# Patient Record
Sex: MALE | Race: WHITE | Employment: FULL TIME | URBAN - METROPOLITAN AREA
[De-identification: names, ages, dates, MRNs, and addresses within clinical notes are randomized per-mention and may not be internally consistent; named-entity substitution may affect disease eponyms.]

---

## 2022-04-19 PROBLEM — I44.7 LBBB (LEFT BUNDLE BRANCH BLOCK): Status: ACTIVE | Noted: 2022-04-19

## 2022-04-19 PROBLEM — E78.00 PURE HYPERCHOLESTEROLEMIA: Status: ACTIVE | Noted: 2022-04-19

## 2022-04-19 PROBLEM — R01.1 MURMUR: Status: ACTIVE | Noted: 2022-04-19

## 2022-04-19 PROBLEM — R07.89 CHEST DISCOMFORT: Status: ACTIVE | Noted: 2022-04-19

## 2022-04-19 PROBLEM — Z82.49 FAMILY HISTORY OF CARDIOVASCULAR DISEASE: Status: ACTIVE | Noted: 2022-04-19

## 2022-04-19 PROBLEM — H91.93 BILATERAL DEAFNESS: Status: ACTIVE | Noted: 2022-04-19

## 2022-04-19 PROBLEM — I10 PRIMARY HYPERTENSION: Status: ACTIVE | Noted: 2022-04-19

## 2022-04-27 NOTE — PROGRESS NOTES
Pre-procedure call completed. Spoke with wife. Instructed to take ASA 81 mg x 4 before arrival along with all other am prescribed medications while HOLDING all vitamins, supplements and HCTZ. Instructed to remain NPO after MN.

## 2022-04-28 ENCOUNTER — HOSPITAL ENCOUNTER (OUTPATIENT)
Age: 77
Setting detail: OUTPATIENT SURGERY
Discharge: HOME | End: 2022-04-28
Attending: INTERNAL MEDICINE | Admitting: INTERNAL MEDICINE
Payer: MEDICARE

## 2022-04-28 VITALS
HEIGHT: 67 IN | HEART RATE: 80 BPM | BODY MASS INDEX: 32.8 KG/M2 | RESPIRATION RATE: 18 BRPM | OXYGEN SATURATION: 98 % | WEIGHT: 209 LBS | TEMPERATURE: 98.2 F | DIASTOLIC BLOOD PRESSURE: 66 MMHG | SYSTOLIC BLOOD PRESSURE: 132 MMHG

## 2022-04-28 DIAGNOSIS — R07.89 CHEST DISCOMFORT: ICD-10-CM

## 2022-04-28 DIAGNOSIS — I44.7 LBBB (LEFT BUNDLE BRANCH BLOCK): ICD-10-CM

## 2022-04-28 DIAGNOSIS — I10 PRIMARY HYPERTENSION: ICD-10-CM

## 2022-04-28 LAB
ACT BLD: 267 SECS (ref 70–128)
ACT BLD: 321 SECS (ref 70–128)
ANION GAP SERPL CALC-SCNC: 6 MMOL/L (ref 7–16)
ATRIAL RATE: 52 BPM
ATRIAL RATE: 55 BPM
BUN SERPL-MCNC: 14 MG/DL (ref 8–23)
CALCIUM SERPL-MCNC: 9.2 MG/DL (ref 8.3–10.4)
CALCULATED P AXIS, ECG09: -25 DEGREES
CALCULATED P AXIS, ECG09: 44 DEGREES
CALCULATED R AXIS, ECG10: -57 DEGREES
CALCULATED R AXIS, ECG10: 4 DEGREES
CALCULATED T AXIS, ECG11: -147 DEGREES
CALCULATED T AXIS, ECG11: 138 DEGREES
CHLORIDE SERPL-SCNC: 106 MMOL/L (ref 98–107)
CO2 SERPL-SCNC: 29 MMOL/L (ref 21–32)
CREAT SERPL-MCNC: 0.9 MG/DL (ref 0.8–1.5)
DIAGNOSIS, 93000: NORMAL
DIAGNOSIS, 93000: NORMAL
ERYTHROCYTE [DISTWIDTH] IN BLOOD BY AUTOMATED COUNT: 12.6 % (ref 11.9–14.6)
GLUCOSE SERPL-MCNC: 108 MG/DL (ref 65–100)
HCT VFR BLD AUTO: 37.6 % (ref 41.1–50.3)
HGB BLD-MCNC: 12.7 G/DL (ref 13.6–17.2)
MAGNESIUM SERPL-MCNC: 2.4 MG/DL (ref 1.8–2.4)
MCH RBC QN AUTO: 32.6 PG (ref 26.1–32.9)
MCHC RBC AUTO-ENTMCNC: 33.8 G/DL (ref 31.4–35)
MCV RBC AUTO: 96.7 FL (ref 79.6–97.8)
NRBC # BLD: 0 K/UL (ref 0–0.2)
P-R INTERVAL, ECG05: 202 MS
P-R INTERVAL, ECG05: 214 MS
PLATELET # BLD AUTO: 308 K/UL (ref 150–450)
PMV BLD AUTO: 9 FL (ref 9.4–12.3)
POTASSIUM SERPL-SCNC: 3.9 MMOL/L (ref 3.5–5.1)
Q-T INTERVAL, ECG07: 464 MS
Q-T INTERVAL, ECG07: 464 MS
QRS DURATION, ECG06: 156 MS
QRS DURATION, ECG06: 168 MS
QTC CALCULATION (BEZET), ECG08: 431 MS
QTC CALCULATION (BEZET), ECG08: 443 MS
RBC # BLD AUTO: 3.89 M/UL (ref 4.23–5.6)
SODIUM SERPL-SCNC: 141 MMOL/L (ref 138–145)
VENTRICULAR RATE, ECG03: 52 BPM
VENTRICULAR RATE, ECG03: 55 BPM
WBC # BLD AUTO: 5.5 K/UL (ref 4.3–11.1)

## 2022-04-28 PROCEDURE — 85027 COMPLETE CBC AUTOMATED: CPT

## 2022-04-28 PROCEDURE — 77030013529 HC DEV PLLBK SLED BSC -B: Performed by: INTERNAL MEDICINE

## 2022-04-28 PROCEDURE — 74011250637 HC RX REV CODE- 250/637: Performed by: INTERNAL MEDICINE

## 2022-04-28 PROCEDURE — 77030012468 HC VLV BLEEDBK CNTRL ABBT -B: Performed by: INTERNAL MEDICINE

## 2022-04-28 PROCEDURE — 80048 BASIC METABOLIC PNL TOTAL CA: CPT

## 2022-04-28 PROCEDURE — C1769 GUIDE WIRE: HCPCS | Performed by: INTERNAL MEDICINE

## 2022-04-28 PROCEDURE — 92928 PRQ TCAT PLMT NTRAC ST 1 LES: CPT | Performed by: INTERNAL MEDICINE

## 2022-04-28 PROCEDURE — 99152 MOD SED SAME PHYS/QHP 5/>YRS: CPT | Performed by: INTERNAL MEDICINE

## 2022-04-28 PROCEDURE — C1887 CATHETER, GUIDING: HCPCS | Performed by: INTERNAL MEDICINE

## 2022-04-28 PROCEDURE — 93005 ELECTROCARDIOGRAM TRACING: CPT | Performed by: INTERNAL MEDICINE

## 2022-04-28 PROCEDURE — C1874 STENT, COATED/COV W/DEL SYS: HCPCS | Performed by: INTERNAL MEDICINE

## 2022-04-28 PROCEDURE — 74011000250 HC RX REV CODE- 250: Performed by: INTERNAL MEDICINE

## 2022-04-28 PROCEDURE — 93460 R&L HRT ART/VENTRICLE ANGIO: CPT | Performed by: INTERNAL MEDICINE

## 2022-04-28 PROCEDURE — 85347 COAGULATION TIME ACTIVATED: CPT

## 2022-04-28 PROCEDURE — 92978 ENDOLUMINL IVUS OCT C 1ST: CPT | Performed by: INTERNAL MEDICINE

## 2022-04-28 PROCEDURE — 92979 ENDOLUMINL IVUS OCT C EA: CPT | Performed by: INTERNAL MEDICINE

## 2022-04-28 PROCEDURE — C1894 INTRO/SHEATH, NON-LASER: HCPCS | Performed by: INTERNAL MEDICINE

## 2022-04-28 PROCEDURE — C1725 CATH, TRANSLUMIN NON-LASER: HCPCS | Performed by: INTERNAL MEDICINE

## 2022-04-28 PROCEDURE — C1751 CATH, INF, PER/CENT/MIDLINE: HCPCS | Performed by: INTERNAL MEDICINE

## 2022-04-28 PROCEDURE — 99205 OFFICE O/P NEW HI 60 MIN: CPT | Performed by: THORACIC SURGERY (CARDIOTHORACIC VASCULAR SURGERY)

## 2022-04-28 PROCEDURE — 74011000636 HC RX REV CODE- 636: Performed by: INTERNAL MEDICINE

## 2022-04-28 PROCEDURE — 77030004558 HC CATH ANGI DX SUPR TORQ CARD -A: Performed by: INTERNAL MEDICINE

## 2022-04-28 PROCEDURE — 99153 MOD SED SAME PHYS/QHP EA: CPT | Performed by: INTERNAL MEDICINE

## 2022-04-28 PROCEDURE — 74011000636 HC RX REV CODE- 636

## 2022-04-28 PROCEDURE — 77030029997 HC DEV COM RDL R BND TELE -B: Performed by: INTERNAL MEDICINE

## 2022-04-28 PROCEDURE — 83735 ASSAY OF MAGNESIUM: CPT

## 2022-04-28 PROCEDURE — 74011250636 HC RX REV CODE- 250/636: Performed by: INTERNAL MEDICINE

## 2022-04-28 PROCEDURE — C1753 CATH, INTRAVAS ULTRASOUND: HCPCS | Performed by: INTERNAL MEDICINE

## 2022-04-28 PROCEDURE — C1761 HC CATH LITHO SHOCKWAVE SWMD -J: HCPCS | Performed by: INTERNAL MEDICINE

## 2022-04-28 DEVICE — STENT RONYX35018UX RESOLUTE ONYX 3.50X18
Type: IMPLANTABLE DEVICE | Site: HEART | Status: FUNCTIONAL
Brand: RESOLUTE ONYX™

## 2022-04-28 DEVICE — STENT RONYX35038UX RESOLUTE ONYX 3.50X38
Type: IMPLANTABLE DEVICE | Status: FUNCTIONAL
Brand: RESOLUTE ONYX™

## 2022-04-28 RX ORDER — MIDAZOLAM HYDROCHLORIDE 1 MG/ML
INJECTION, SOLUTION INTRAMUSCULAR; INTRAVENOUS AS NEEDED
Status: DISCONTINUED | OUTPATIENT
Start: 2022-04-28 | End: 2022-04-28 | Stop reason: HOSPADM

## 2022-04-28 RX ORDER — GUAIFENESIN 100 MG/5ML
81 LIQUID (ML) ORAL DAILY
Qty: 1 TABLET | Refills: 0 | Status: SHIPPED
Start: 2022-04-28

## 2022-04-28 RX ORDER — LIDOCAINE HYDROCHLORIDE 10 MG/ML
INJECTION INFILTRATION; PERINEURAL AS NEEDED
Status: DISCONTINUED | OUTPATIENT
Start: 2022-04-28 | End: 2022-04-28 | Stop reason: HOSPADM

## 2022-04-28 RX ORDER — HEPARIN SODIUM 200 [USP'U]/100ML
INJECTION, SOLUTION INTRAVENOUS
Status: COMPLETED | OUTPATIENT
Start: 2022-04-28 | End: 2022-04-28

## 2022-04-28 RX ORDER — HYDROMORPHONE HYDROCHLORIDE 2 MG/ML
INJECTION, SOLUTION INTRAMUSCULAR; INTRAVENOUS; SUBCUTANEOUS AS NEEDED
Status: DISCONTINUED | OUTPATIENT
Start: 2022-04-28 | End: 2022-04-28 | Stop reason: HOSPADM

## 2022-04-28 RX ORDER — GUAIFENESIN 100 MG/5ML
324 LIQUID (ML) ORAL DAILY
Status: DISCONTINUED | OUTPATIENT
Start: 2022-04-28 | End: 2022-04-28 | Stop reason: HOSPADM

## 2022-04-28 RX ORDER — HEPARIN SODIUM 10000 [USP'U]/ML
INJECTION, SOLUTION INTRAVENOUS; SUBCUTANEOUS AS NEEDED
Status: DISCONTINUED | OUTPATIENT
Start: 2022-04-28 | End: 2022-04-28 | Stop reason: HOSPADM

## 2022-04-28 RX ORDER — SODIUM CHLORIDE 9 MG/ML
75 INJECTION, SOLUTION INTRAVENOUS CONTINUOUS
Status: DISCONTINUED | OUTPATIENT
Start: 2022-04-28 | End: 2022-04-28 | Stop reason: HOSPADM

## 2022-04-28 RX ADMIN — SODIUM CHLORIDE 75 ML/HR: 900 INJECTION, SOLUTION INTRAVENOUS at 06:58

## 2022-04-28 NOTE — PROGRESS NOTES
Anna Quinteros. MD Daryn Aguiar. Ayaz Muñoz MD           Consult Note    Roxana Swain [facility]         4/19/2022 1945    REFERRING PHYSICIAN:  Dr. Kyung Islas:  The patient is a 78 y.o. male who is seen for evaluation of aortic stenosis. He was seen in the office in consultation by Dr Marty Garcia for recurrent chest pain and TOBAR. He was noted to have a significant murmur on exam. Echocardiogram showed a mildly reduced LV EF and severe AS. LHC was planned as well. He underwent cardiac catheterization today that showed severe stenosis of the RCA and LAD. Past Medical History:   Diagnosis Date    Hypertension        History reviewed. No pertinent surgical history. History reviewed. No pertinent family history. Social History     Socioeconomic History    Marital status:      Spouse name: Not on file    Number of children: Not on file    Years of education: Not on file    Highest education level: Not on file   Occupational History    Not on file   Tobacco Use    Smoking status: Never Smoker    Smokeless tobacco: Never Used   Substance and Sexual Activity    Alcohol use: Not on file    Drug use: Not on file    Sexual activity: Not on file   Other Topics Concern    Not on file   Social History Narrative    Not on file     Social Determinants of Health     Financial Resource Strain:     Difficulty of Paying Living Expenses: Not on file   Food Insecurity:     Worried About Running Out of Food in the Last Year: Not on file    Ky of Food in the Last Year: Not on file   Transportation Needs:     Lack of Transportation (Medical): Not on file    Lack of Transportation (Non-Medical):  Not on file   Physical Activity:     Days of Exercise per Week: Not on file    Minutes of Exercise per Session: Not on file   Stress:     Feeling of Stress : Not on file   Social Connections:     Frequency of Communication with Friends and Family: Not on file    Frequency of Social Gatherings with Friends and Family: Not on file    Attends Shinto Services: Not on file    Active Member of Clubs or Organizations: Not on file    Attends Club or Organization Meetings: Not on file    Marital Status: Not on file   Intimate Partner Violence:     Fear of Current or Ex-Partner: Not on file    Emotionally Abused: Not on file    Physically Abused: Not on file    Sexually Abused: Not on file   Housing Stability:     Unable to Pay for Housing in the Last Year: Not on file    Number of [place] in the Last Year: Not on file    Unstable Housing in the Last Year: Not on file       Not on File    No current facility-administered medications on file prior to encounter. Current Outpatient Medications on File Prior to Encounter   Medication Sig Dispense Refill    lisinopril-hydroCHLOROthiazide (PRINZIDE, ZESTORETIC) 20-25 mg per tablet Take  by mouth daily.  OTHER Allergy relief      multivitamin (ONE A DAY) tablet Take 1 Tablet by mouth daily.  vitamin E acetate (VITAMIN E PO) Take  by mouth.  cyanocobalamin, vitamin B-12, (VITAMIN B-12 PO) Take  by mouth.  docosahexaenoic acid/epa (FISH OIL PO) Take  by mouth.  pitavastatin calcium (Livalo) 4 mg tab tablet Take 1 Tablet by mouth daily. 30 Tablet 5    isosorbide mononitrate ER (IMDUR) 30 mg tablet Take 1 Tablet by mouth every morning. 30 Tablet 5       REVIEW OF SYSTEMS:  Review of Systems   Constitutional: Negative for chills, fever, malaise/fatigue, weight gain and weight loss. HENT: Negative for ear pain, hearing loss, nosebleeds, sore throat and tinnitus. Eyes: Negative for blurred vision, vision loss in left eye and vision loss in right eye. Cardiovascular: Positive for chest pain and dyspnea on exertion. Negative for leg swelling, near-syncope, orthopnea, palpitations, paroxysmal nocturnal dyspnea and syncope. Respiratory: Positive for shortness of breath.  Negative for cough, hemoptysis, sputum production and wheezing. Endocrine: Negative for cold intolerance, heat intolerance and polydipsia. Hematologic/Lymphatic: Does not bruise/bleed easily. Skin: Negative for color change and rash. Musculoskeletal: Negative for back pain, joint pain, joint swelling and myalgias. Gastrointestinal: Negative for abdominal pain, constipation, diarrhea, dysphagia, heartburn, hematemesis, melena, nausea and vomiting. Genitourinary: Negative for dysuria, frequency, hematuria and urgency. Neurological: Negative for difficulty with concentration, dizziness, headaches, light-headedness, numbness, paresthesias, seizures, vertigo and weakness. Psychiatric/Behavioral: Negative for altered mental status and depression.        Physical Exam  Vitals:    04/28/22 1145 04/28/22 1200 04/28/22 1215 04/28/22 1230   BP: (!) 142/83 (!) 153/89 (!) 149/79    Pulse: 71 (!) 54 67 69   Resp:       Temp:       SpO2: 96% 97% 96% 97%   Weight:       Height:           Physical Exam:  General: Well Developed, Well Nourished, No Acute Distress  HEENT: Normocephalic, pupils equal and round, no scleral icterus  Neck: supple, no JVD  Chest wall: No deformity  Heart: S1S2 with RRR with grade III/VI DORIE   Lungs: Clear throughout auscultation bilaterally without adventitious sounds  Abd: soft, nontender, nondistended, with good bowel sounds, no pulsatile masses  Ext: warm, no edema, calves supple/nontender, pulses 2+ bilaterally  Skin: warm and dry, no rashes, cyanosis, jaundice, ecchymoses or evidence of skin breakdown  Psychiatric: Normal mood and affect  Neurologic: Alert and oriented X 3, no focal deficit noted    Labs:   Recent Labs     04/28/22  0633      K 3.9   MG 2.4   BUN 14   CREA 0.90   *   WBC 5.5   HGB 12.7*   HCT 37.6*          Assessment:     Active Problems:    LBBB (left bundle branch block) (4/19/2022)    Primary hypertension (4/19/2022)    Chest discomfort (4/19/2022)    CAD    Aortic Stenosis       Plan:     SAVR vs TAVR discussed with patient and wife. Pt prefers TAVR.        Anastasia Barnes MD

## 2022-04-28 NOTE — PROGRESS NOTES
Patient feeling SOB. Patient has already been drinking some pepsi but now is trying some coffee to see if that will help possible side effects from Brilinta. Dr. Domitila Toscano made aware.

## 2022-04-28 NOTE — PROGRESS NOTES
Gómez Mckeon. MD Oliverio Savage. Delos Goldberg, MD           [address]         4/19/2022 1945    REFERRING PHYSICIAN:  Dr. Masterson:  The patient is a 78 y.o. male who is seen for evaluation of aortic stenosis. He was seen in the office in consultation by Dr Trey Mohs for recurrent chest pain and TOBAR. He was noted to have a significant murmur on exam. Echocardiogram showed a mildly reduced LV EF and severe AS. LHC was planned as well. He underwent cardiac catheterization today that showed severe stenosis of the RCA and LAD. Past Medical History:   Diagnosis Date    Hypertension        History reviewed. No pertinent surgical history. History reviewed. No pertinent family history. Social History     Socioeconomic History    Marital status:      Spouse name: Not on file    Number of children: Not on file    Years of education: Not on file    Highest education level: Not on file   Occupational History    Not on file   Tobacco Use    Smoking status: Never Smoker    Smokeless tobacco: Never Used   Substance and Sexual Activity    Alcohol use: Not on file    Drug use: Not on file    Sexual activity: Not on file   Other Topics Concern    Not on file   Social History Narrative    Not on file     Social Determinants of Health     Financial Resource Strain:     Difficulty of Paying Living Expenses: Not on file   Food Insecurity:     Worried About Running Out of Food in the Last Year: Not on file    [place] of Food in the Last Year: Not on file   Transportation Needs:     Lack of Transportation (Medical): Not on file    Lack of Transportation (Non-Medical):  Not on file   Physical Activity:     Days of Exercise per Week: Not on file    Minutes of Exercise per Session: Not on file   Stress:     Feeling of Stress : Not on file   Social Connections:     Frequency of Communication with Friends and Family: Not on file    Frequency of Social Gatherings with Friends and Family: Not on file    Attends Hoahaoism Services: Not on file    Active Member of Clubs or Organizations: Not on file    Attends Club or Organization Meetings: Not on file    Marital Status: Not on file   Intimate Partner Violence:     Fear of Current or Ex-Partner: Not on file    Emotionally Abused: Not on file    Physically Abused: Not on file    Sexually Abused: Not on file   Housing Stability:     Unable to Pay for Housing in the Last Year: Not on file    Number of [place] in the Last Year: Not on file    Unstable Housing in the Last Year: Not on file       Not on File    No current facility-administered medications on file prior to encounter. Current Outpatient Medications on File Prior to Encounter   Medication Sig Dispense Refill    lisinopril-hydroCHLOROthiazide (PRINZIDE, ZESTORETIC) 20-25 mg per tablet Take  by mouth daily.  OTHER Allergy relief      multivitamin (ONE A DAY) tablet Take 1 Tablet by mouth daily.  vitamin E acetate (VITAMIN E PO) Take  by mouth.  cyanocobalamin, vitamin B-12, (VITAMIN B-12 PO) Take  by mouth.  docosahexaenoic acid/epa (FISH OIL PO) Take  by mouth.  pitavastatin calcium (Livalo) 4 mg tab tablet Take 1 Tablet by mouth daily. 30 Tablet 5    isosorbide mononitrate ER (IMDUR) 30 mg tablet Take 1 Tablet by mouth every morning. 30 Tablet 5       REVIEW OF SYSTEMS:  Review of Systems   Constitutional: Negative for chills, fever, malaise/fatigue, weight gain and weight loss. HENT: Negative for ear pain, hearing loss, nosebleeds, sore throat and tinnitus. Eyes: Negative for blurred vision, vision loss in left eye and vision loss in right eye. Cardiovascular: Positive for chest pain and dyspnea on exertion. Negative for leg swelling, near-syncope, orthopnea, palpitations, paroxysmal nocturnal dyspnea and syncope. Respiratory: Positive for shortness of breath.  Negative for cough, hemoptysis, sputum production and wheezing. Endocrine: Negative for cold intolerance, heat intolerance and polydipsia. Hematologic/Lymphatic: Does not bruise/bleed easily. Skin: Negative for color change and rash. Musculoskeletal: Negative for back pain, joint pain, joint swelling and myalgias. Gastrointestinal: Negative for abdominal pain, constipation, diarrhea, dysphagia, heartburn, hematemesis, melena, nausea and vomiting. Genitourinary: Negative for dysuria, frequency, hematuria and urgency. Neurological: Negative for difficulty with concentration, dizziness, headaches, light-headedness, numbness, paresthesias, seizures, vertigo and weakness. Psychiatric/Behavioral: Negative for altered mental status and depression.        Physical Exam  Vitals:    04/28/22 0652   BP: (!) 147/90   Pulse: (!) 59   Resp: 18   Temp: 98.2 °F (36.8 °C)   SpO2: 97%   Weight: 209 lb (94.8 kg)   Height: 5' 7\" (1.702 m)       Physical Exam:  General: Well Developed, Well Nourished, No Acute Distress  HEENT: Normocephalic, pupils equal and round, no scleral icterus  Neck: supple, no JVD  Chest wall: No deformity  Heart: S1S2 with RRR with grade III/VI DORIE   Lungs: Clear throughout auscultation bilaterally without adventitious sounds  Abd: soft, nontender, nondistended, with good bowel sounds, no pulsatile masses  Ext: warm, no edema, calves supple/nontender, pulses 2+ bilaterally  Skin: warm and dry, no rashes, cyanosis, jaundice, ecchymoses or evidence of skin breakdown  Psychiatric: Normal mood and affect  Neurologic: Alert and oriented X 3, no focal deficit noted    Labs:   Recent Labs     04/28/22  0633      K 3.9   MG 2.4   BUN 14   CREA 0.90   *   WBC 5.5   HGB 12.7*   HCT 37.6*          Assessment:     Active Problems:    LBBB (left bundle branch block) (4/19/2022)    Primary hypertension (4/19/2022)    Chest discomfort (4/19/2022)    CAD    Aortic Stenosis       Plan:     SAVR vs TAVR discussed with patient and wife. Pt prefers TAVR.        Lashay Dejesus PA-C

## 2022-04-28 NOTE — PROGRESS NOTES
Discharge Same Day as PCI Teaching    Discharge teaching completed. Patient instructed on right radial site care, including symptoms to report to MD, medication changes & Follow up Care to include:    1- Patient is being treated with 2 separate anti-platelet medications including aspirin 81 mg  & Brilinta 90 mg every 12 hours. It is very important that these medications are filled today started tonight. Patient instructed on the importance of these medications & that these medications must not be stopped for any reason or without talking to the doctor first.  2-  Patient is also being discharged on a medication for cholesterol management (ie-statin)  and instructed on the importance of continuing this medication as well. 3- Patient received a referral for [facility], contact information was faxed to Cardiac rehab & patient given telephone number to contact [phone]) Cardiac Rehab if they have not heard from them within 10 days.

## 2022-04-28 NOTE — ROUTINE PROCESS
Patient received to [address] [place] [address] room # 10  Ambulatory from [facility]. Patient scheduled for [facility] today with Dr Cyndi Ware. Procedure reviewed & questions answered, voiced good understanding consent obtained & placed on chart. All medications and medical history reviewed. Will prep patient per orders. Patient & family updated on plan of care. The patient has a fraility score of 3-MANAGING WELL, based on reports no recent falls.     ASA 81 mg x 4 @ 0500

## 2022-04-28 NOTE — PROGRESS NOTES
Report received from [facility] Lab RN. Procedural findings communicated. Intra procedural  medication administration reviewed. Progression of care discussed.      Patient received into [address] [facility] 5 post sheath removal.     Access site without bleeding or swelling yes    Dressing dry and intact yes    Patient instructed to limit movement to right upper extremity    Routine post procedural vital signs and site assessment initiated yes

## 2022-04-28 NOTE — DISCHARGE INSTRUCTIONS
POST PCI/STENT DISCHARGE INSTRUCTIONS    1. Check puncture site frequently for swelling or bleeding. If there is any bleeding, lie down and apply pressure over the area with a clean towel or washcloth and call 911. Notify your doctor for any redness, swelling, drainage, or oozing from the puncture site. Notify your doctor for any fever or chills. 2. If the extremity becomes cold, numb, or painful call [facility] at [phone].    3. Activity should be limited for the next 48-72 hours. No heavy lifting (anything over 10 pounds) for 3 days. No pushing or pulling with right arm for the next three days. 4.  You may resume your usual diet. Drink more fluids than usual.    5.  Have a responsible person drive you home and stay with you for at least 24 hours after your heart catheterization/angiography. No driving for 24 hours. 6. You may remove bandage from your right arm in 24 hours. You may shower in 24 hours. No tub baths, hot tubs, or swimming for 1 week. Do not place any lotions, creams, powders, or ointments over puncture site for 1 week. You may place a clean band-aid over the puncture site each day for 5 days. Change daily. YOU ARE BEING DISCHARGED ON TWO ANTI-PLATELET MEDICATIONS    1- Brilinta 90 mg every 12 hours    2- Aspirin 81 mg daily    THESE MEDICATIONS  ARE VERY IMPORTANT TO YOUR RECOVERY AND  MUST BE TAKEN EXACTLY AS PRESCRIBED & NOT STOPPED FOR ANY REASON. THESE MAY ONLY BE STOPPED WITH AN ORDER FROM YOUR CARDIOLOGIST. PLEASE HAVE THESE FILLED IMMEDIATELY AND START TAKING Brilinta tonight     ***IF YOUR PHARMACY IS UNABLE TO FILL YOUR ANTIPLATELET MEDICATION *** IMMEDIATELY,  PLEASE CALL [facility] CARDIOLOGY -[phone], EVEN IF IT'S AFTER OFFICE HOURS. AGAIN, IT'S VERY IMPORTANT THAT NO DOSES ARE MISSED    YOU ARE ALSO BEING DISCHARGED ON A MEDICATION FOR CHOLESTEROL MANAGEMENT. 1- Livalo 4 mg daily      You have also been referred to [facility].  Someone from Cardiac Rehab will be calling you to set up a follow up appointment. If they have not called you within a week,  please call [phone]. Sedation for a Medical Procedure: Care Instructions     You were given a sedative medication during your visit. While many of the effects will have worn   off before you leave; you may continue to feel some effects for several hours. Common side effects from sedation include:  · Feeling sleepy. (Your doctors and nurses will make sure you are not too sleepy to go home.)  · Nausea and vomiting. This usually does not last long. · Feeling tired. How can you care for yourself at home? Activity    · Don't do anything for 24 hours that requires attention to detail. It takes time for the medicine effects to completely wear off. · Do not make important legal decisions for 24 hours. · Do not sign any legal documents for 24 hours. · Do not drink alcohol today     · For your safety, you should not drive or operate heavy machinery for the remainder of the day     · Rest when you feel tired. Getting enough sleep will help you recover. Diet    · You can eat your normal diet, unless your doctor gives you other instructions. If your stomach is upset, try clear liquids and bland, low-fat foods like plain toast or rice. · Drink plenty of fluids (unless your doctor tells you not to). · Don't drink alcohol for 24 hours. Medicines    · Be safe with medicines. Read and follow all instructions on the label. · If the doctor gave you a prescription medicine for pain, take it as prescribed. · If you are not taking a prescription pain medicine, ask your doctor if you can take an over-the-counter medicine. · If you think your pain medicine is making you sick to your stomach:  · Take your medicine after meals (unless your doctor has told you not to). · Ask your doctor for a different pain medicine.              Percutaneous Coronary Intervention: What to Expect at [facility]     Percutaneous coronary intervention (PCI) is the name for procedures that are used to open a narrowed or blocked coronary artery. The two most common PCI procedures are coronary angioplasty and coronary stent placement. Your groin or arm may have a bruise and feel sore for a day or two after a percutaneous coronary intervention (PCI). You can do light activities around the house, but nothing strenuous for several days. This care sheet gives you a general idea about how long it will take for you to recover. But each person recovers at a different pace. Follow the steps below to get better as quickly as possible. How can you care for yourself at home? Activity  · Do not do strenuous exercise and do not lift, pull, or push anything heavy until your doctor says it is okay. This may be for a day or two. You can walk around the house and do light activity, such as cooking. · You may shower 24 to 48 hours after the procedure, if your doctor okays it. Pat the incision dry. Do not take a bath for 1 week, or until your doctor tells you it is okay. · If the catheter was placed in your groin, try not to walk up stairs for the first couple of days. · If the catheter was placed in your arm near your wrist, do not bend your wrist deeply for the first couple of days. Be careful using your hand to get into and out of a chair or bed. · If your doctor recommends it, get more exercise. Walking is a good choice. Bit by bit, increase the amount you walk every day. Try for at least 30 minutes on most days of the week. Diet  · Drink plenty of fluids to help your body flush out the dye. If you have kidney, heart, or liver disease and have to limit fluids, talk with your doctor before you increase the amount of fluids you drink. · Keep eating a heart-healthy diet that has lots of fruits, vegetables, and whole grains. If you have not been eating this way, talk to your doctor.  You also may want to talk to a dietitian. This expert can help you to learn about healthy foods and plan meals. Medicines  · Your doctor will tell you if and when you can restart your medicines. He or she will also give you instructions about taking any new medicines. · If you take blood thinners, such as warfarin (Coumadin), clopidogrel (Plavix), or aspirin, be sure to talk to your doctor. He or she will tell you if and when to start taking those medicines again. Make sure that you understand exactly what your doctor wants you to do. · Your doctor will prescribe blood-thinning medicines. You will likely take aspirin plus another antiplatelet, such as clopidogrel (Plavix). It is very important that you take these medicines exactly as directed. These medicines help keep the coronary artery open and reduce your risk of a heart attack. · Call your doctor if you think you are having a problem with your medicine. Care of the catheter site  · For 1 or 2 days, keep a bandage over the spot where the catheter was inserted. The bandage probably will fall off in this time. · Put ice or a cold pack on the area for 10 to 20 minutes at a time to help with soreness or swelling. Put a thin cloth between the ice and your skin. Follow-up care is a key part of your treatment and safety. Be sure to make and go to all appointments, and call your doctor if you are having problems. It's also a good idea to know your test results and keep a list of the medicines you take. When should you call for help? Call 911 anytime you think you may need emergency care. For example, call if:  · You passed out (lost consciousness). · You have severe trouble breathing. · You have sudden chest pain and shortness of breath, or you cough up blood. · You have symptoms of a heart attack, such as:  ¨ Chest pain or pressure. ¨ Sweating. ¨ Shortness of breath. ¨ Nausea or vomiting.   ¨ Pain that spreads from the chest to the neck, jaw, or one or both shoulders or arms. ¨ Dizziness or lightheadedness. ¨ A fast or uneven pulse. After calling 911, chew 1 adult-strength aspirin. Wait for an ambulance. Do not try to drive yourself. · You have been diagnosed with angina, and you have angina symptoms that do not go away with rest or are not getting better within 5 minutes after you take one dose of nitroglycerin. Call your doctor now or seek immediate medical care if:  · You are bleeding from the area where the catheter was put in your artery. · You have a fast-growing, painful lump at the catheter site. · You have signs of infection, such as:  ¨ Increased pain, swelling, warmth, or redness. ¨ Red streaks leading from the catheter site. ¨ Pus draining from the catheter site. ¨ A fever. · Your leg or arm looks blue or feels cold, numb, or tingly. Watch closely for changes in your health, and be sure to contact your doctor if you have any problems. Where can you learn more? Go to [employer].be  Enter M274 in the search box to learn more about \"Percutaneous Coronary Intervention: What to Expect at Home. \"   © 8657-3404 Healthwise, Incorporated. Care instructions adapted under license by [employer] (which disclaims liability or warranty for this information). This care instruction is for use with your licensed healthcare professional. If you have questions about a medical condition or this instruction, always ask your healthcare professional. [place] [address] any warranty or liability for your use of this information. Content Version: 11.6.812880; Current as of: May 22, 2015         Cardiac Rehabilitation: Care Instructions  Your Care Instructions    Cardiac rehabilitation is a program for people who have a heart problem, such as a heart attack, heart failure, or a heart valve disease. The program includes exercise, lifestyle changes, education, and emotional support.  Cardiac rehab can help you improve the quality of your life through better overall health. It can help you lose weight and feel better about yourself. On your cardiac rehab team, you may have your doctor, a nurse specialist, a dietitian, and a physical therapist. They will design your cardiac rehab program specifically for you. You will learn how to reduce your risk for heart problems, how to manage stress, and how to eat a heart-healthy diet. By the end of the program, you will be ready to maintain a healthier lifestyle on your own. Follow-up care is a key part of your treatment and safety. Be sure to make and go to all appointments, and call your doctor if you are having problems. It's also a good idea to know your test results and keep a list of the medicines you take. How can you care for yourself at home? · Take your medicines exactly as prescribed. Call your doctor if you think you are having a problem with your medicine. You will get more details on the specific medicines your doctor prescribes. · Weigh yourself every day if your doctor tells you to. Watch for sudden weight gain. Weigh yourself on the same scale with the same amount of clothing at the same time of day. · Plan your meals so that you are eating heart-healthy foods. ¨ Eat a variety of foods daily. Fresh fruits and vegetables and whole-grains are good choices. ¨ Limit your fat intake, especially saturated and trans fat. ¨ Limit salt (sodium). ¨ Increase fiber in your diet. ¨ Limit alcohol. · Learn how to take your pulse so that you can track your heart rate during exercise. · Always check with your doctor before you begin a new exercise program.  · Warm up before you exercise and cool down afterward for at least 15 minutes each. This will help your heart gradually prepare for and recover from exercise and avoid pushing your heart too hard. · Stop exercising if you have any unusual discomfort, such as chest pain. · Do not smoke. Smoking can make heart problems worse.  If you need help quitting, talk to your doctor about stop-smoking programs and medicines. These can increase your chances of quitting for good. When should you call for help? Call 911 anytime you think you may need emergency care. For example, call if:  · You have severe trouble breathing. · You cough up pink, foamy mucus and you have trouble breathing. · You have symptoms of a heart attack. These may include:  ¨ Chest pain or pressure, or a strange feeling in the chest.  ¨ Sweating. ¨ Shortness of breath. ¨ Nausea or vomiting. ¨ Pain, pressure, or a strange feeling in the back, neck, jaw, or upper belly or in one or both shoulders or arms. ¨ Lightheadedness or sudden weakness. ¨ A fast or irregular heartbeat. After you call 911, the  may tell you to chew 1 adult-strength or 2 to 4 low-dose aspirin. Wait for an ambulance. Do not try to drive yourself. · You have angina symptoms (such as chest pain or pressure) that do not go away with rest or are not getting better within 5 minutes after you take a dose of nitroglycerin. · You have symptoms of a stroke. These may include:  ¨ Sudden numbness, tingling, weakness, or loss of movement in your face, arm, or leg, especially on only one side of your body. ¨ Sudden vision changes. ¨ Sudden trouble speaking. ¨ Sudden confusion or trouble understanding simple statements. ¨ Sudden problems with walking or balance. ¨ A sudden, severe headache that is different from past headaches. · You passed out (lost consciousness). Call your doctor now or seek immediate medical care if:  · You have new or increased shortness of breath. · You are dizzy or lightheaded, or you feel like you may faint. · You gain weight suddenly, such as 3 pounds or more in 2 to 3 days. (Your doctor may suggest a different range of weight gain.)  · You have increased swelling in your legs, ankles, or feet.   Watch closely for changes in your health, and be sure to contact your doctor if you have any problems. Where can you learn more? Go to http://www.gray.com/. Enter R750 in the search box to learn more about \"Cardiac Rehabilitation: Care Instructions. \"  Current as of: May 5, 2016  Content Version: 11.1  © 7765-0923 [employer]. Care instructions adapted under license by [employer] (which disclaims liability or warranty for this information). If you have questions about a medical condition or this instruction, always ask your healthcare professional. [facility] any warranty or liability for your use of this information. [email or web address] Activation    Thank you for requesting access to HealthTell. Please follow the instructions below to securely access and download your online medical record. HealthTell allows you to send messages to your doctor, view your test results, renew your prescriptions, schedule appointments, and more. How Do I Sign Up? 1. In your internet browser, go to [email or web address]. 2. Click on the First Time User? Click Here link in the Sign In box. You will see the New Member Sign Up page. 3. Enter your HealthTell Access Code exactly as it appears below. You will not need to use this code after youve completed the sign-up process. If you do not sign up before the expiration date, you must request a new code. HealthTell Access Code: Activation code not generated  Current HealthTell Status: Active (This is the date your HealthTell access code will )    4. Enter the last four digits of your Social Security Number (xxxx) and Date of Birth (mm/dd/yyyy) as indicated and click Submit. You will be taken to the next sign-up page. 5. Create a [email or web address] ID. This will be your HealthTell login ID and cannot be changed, so think of one that is secure and easy to remember. 6. Create a HealthTell password. You can change your password at any time. 7. Enter your Password Reset Question and Answer.  This can be used at a later time if you forget your password. 8. Enter your e-mail address. You will receive e-mail notification when new information is available in [address] 9. Click Sign Up. You can now view and download portions of your medical record. 10. Click the Download Summary menu link to download a portable copy of your medical information. Additional Information    If you have questions, please visit the Frequently Asked Questions section of the ReadyCart website at [email or web address] Remember, ReadyCart is NOT to be used for urgent needs. For medical emergencies, dial 911.

## 2022-04-28 NOTE — DISCHARGE SUMMARY
[facility] Cardiology Discharge Summary     Patient ID:  Gia Asencio  [id number]  78 y.o.  1945    Admit date: 4/28/2022    Discharge date:  04/28/22    Admitting Physician: Margy Espinoza MD     Discharge Physician: Rona Cardona NP/Dr. LIZANDRO NEUMANN JR[facility]    Admission Diagnoses: Chest discomfort [R07.89]  LBBB (left bundle branch block) [I44.7]  Primary hypertension [I10]    Discharge Diagnoses:    Diagnosis    LBBB (left bundle branch block)    Primary hypertension    Pure hypercholesterolemia    Family history of cardiovascular disease    Bilateral deafness    Murmur    Chest discomfort       Cardiology Procedures this admission:  Left heart catheterization with PCI  Consults: None    Hospital Course: Patient was seen at the office of [facility] Cardiology by Dr. Devon Pool for complaints of chest discomfort concerning for anginal pain and was subsequently scheduled for a LHC. The patient had a LHC by Dr LIZANDRO NEUMANN JR. 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 Patient tolerated the procedure well and was taken to the [address] [place] [address] for recover. The patients recent echo that shows severe AS. During this hospital admission that the patient was evaluated by CV surgery for SAVR vrs TAVR and the patient prefers TAVR at this time. The patient will coordinate with the TAVR originator who was informed about the patient. The following morning patient was up feeling well without any complaints of chest pain or shortness of breath. Patient's right radial cath site was clean, dry and intact without hematoma or bruit. Patient's labs were WNL. Patient was seen and examined by Dr. LIZANDRO NEUMANN JR[facility] and determined stable and ready for discharge. Patient was instructed on the importance of medication compliance. For the OMT of CAD the patient patient will be on the following regiment: The patient will not be on betablocker therapy due to bradycardia. The patient will continue on a ACE.  The patient will continue on statin therapy. The patient will be on DAPT with for one year with: ASA Wayan. The patient will follow up with [facility] and has been referred to cardiac rehab. DISPOSITION: The patient is being discharged home in stable condition on a low saturated fat, low cholesterol and low salt diet. The patient is instructed to advance activities as tolerated to the limit of fatigue or shortness of breath. The patient is instructed to avoid all heavy lifting for 5 days. The patient is instructed to watch the cath site for bleeding/oozing; if seen, the patient is instructed to apply firm pressure with a clean cloth and call [facility] Cardiology at [phone]. The patient is instructed to watch for signs of infection which include: increasing area of redness, fever/hot to touch or purulent drainage at the catheterization site. The patient is instructed not to soak in a bathtub for 7-10 days, but is cleared to shower. The patient is instructed to call the office or return to the ER for immediate evaluation for any shortness of breath or chest pain not relieved by NTG. Discharge Exam:   Visit Vitals  BP (!) 149/79   Pulse 69   Temp 98.2 °F (36.8 °C)   Resp 18   Ht 5' 7\" (1.702 m)   Wt 94.8 kg (209 lb)   SpO2 97%   BMI 32.73 kg/m²     Patient has been seen by Dr. Jacob Rudd: see his progress note for exam details.     Recent Results (from the past 24 hour(s))   CBC W/O DIFF    Collection Time: 04/28/22  6:33 AM   Result Value Ref Range    WBC 5.5 4.3 - 11.1 K/uL    RBC 3.89 (L) 4.23 - 5.6 M/uL    HGB 12.7 (L) 13.6 - 17.2 g/dL    HCT 37.6 (L) 41.1 - 50.3 %    MCV 96.7 79.6 - 97.8 FL    MCH 32.6 26.1 - 32.9 PG    MCHC 33.8 31.4 - 35.0 g/dL    RDW 12.6 11.9 - 14.6 %    PLATELET 120 087 - 083 K/uL    MPV 9.0 (L) 9.4 - 12.3 FL    ABSOLUTE NRBC 0.00 0.0 - 0.2 K/uL   MAGNESIUM    Collection Time: 04/28/22  6:33 AM   Result Value Ref Range    Magnesium 2.4 1.8 - 2.4 mg/dL   METABOLIC PANEL, BASIC Collection Time: 04/28/22  6:33 AM   Result Value Ref Range    Sodium 141 138 - 145 mmol/L    Potassium 3.9 3.5 - 5.1 mmol/L    Chloride 106 98 - 107 mmol/L    CO2 29 21 - 32 mmol/L    Anion gap 6 (L) 7 - 16 mmol/L    Glucose 108 (H) 65 - 100 mg/dL    BUN 14 8 - 23 MG/DL    Creatinine 0.90 0.8 - 1.5 MG/DL    GFR est AA >60 >60 ml/min/1.73m2    GFR est non-AA >60 >60 ml/min/1.73m2    Calcium 9.2 8.3 - 10.4 MG/DL   EKG, 12 LEAD, INITIAL    Collection Time: 04/28/22  7:07 AM   Result Value Ref Range    Ventricular Rate 52 BPM    Atrial Rate 52 BPM    P-R Interval 202 ms    QRS Duration 168 ms    Q-T Interval 464 ms    QTC Calculation (Bezet) 431 ms    Calculated P Axis -25 degrees    Calculated R Axis 4 degrees    Calculated T Axis -147 degrees    Diagnosis       Sinus bradycardia  Left bundle branch block  Abnormal ECG  When compared with ECG of 17-NOV-2014 22:11,  Left bundle branch block is now Present     POC ACTIVATED CLOTTING TIME    Collection Time: 04/28/22  9:08 AM   Result Value Ref Range    Activated Clotting Time (POC) 321 (H) 70 - 128 SECS   POC ACTIVATED CLOTTING TIME    Collection Time: 04/28/22  9:49 AM   Result Value Ref Range    Activated Clotting Time (POC) 267 (H) 70 - 128 SECS         Patient Instructions:     Current Discharge Medication List      START taking these medications    Details   aspirin 81 mg chewable tablet Take 1 Tablet by mouth daily. Qty: 1 Tablet, Refills: 0  Start date: 4/28/2022      ticagrelor (Brilinta) 90 mg tablet Take 1 Tablet by mouth two (2) times a day. Qty: 60 Tablet, Refills: 5  Start date: 4/28/2022         CONTINUE these medications which have NOT CHANGED    Details   lisinopril-hydroCHLOROthiazide (PRINZIDE, ZESTORETIC) 20-25 mg per tablet Take  by mouth daily. OTHER Allergy relief      multivitamin (ONE A DAY) tablet Take 1 Tablet by mouth daily.       vitamin E acetate (VITAMIN E PO) Take  by mouth.      cyanocobalamin, vitamin B-12, (VITAMIN B-12 PO) Take by mouth. docosahexaenoic acid/epa (FISH OIL PO) Take  by mouth.      pitavastatin calcium (Livalo) 4 mg tab tablet Take 1 Tablet by mouth daily.   Qty: 30 Tablet, Refills: 5         STOP taking these medications       isosorbide mononitrate ER (IMDUR) 30 mg tablet Comments:   Reason for Stopping:               Signed:  OSVALDO Waters  4/28/2022  1:14 PM

## 2022-04-28 NOTE — PROGRESS NOTES
TRANSFER - OUT REPORT:    R/LHC Bonds  RRA/RBV 7 fr sheath capped  RCA 1 stent, LAD 1 stent  Versed 1 mg  Dilaudid 1 mg  Heparin 11,000 units  Brilinta 180 mg  Band 14 ml  No bleeding/hematoma    Verbal report given to April(name) on Lydia Cruz  being transferred to cpru(unit) for routine progression of care       Report consisted of patients Situation, Background, Assessment and   Recommendations(SBAR). Information from the following report(s) SBAR and Procedure Summary was reviewed with the receiving nurse. Lines:   Peripheral IV 04/28/22 Left Antecubital (Active)   Site Assessment Clean, dry, & intact 04/28/22 0656   Phlebitis Assessment 0 04/28/22 0656   Dressing Status Clean, dry, & intact 04/28/22 0656   Dressing Type Transparent 04/28/22 0656   Hub Color/Line Status Pink 04/28/22 0656   Alcohol Cap Used No 04/28/22 0656       Peripheral IV 04/28/22 Right Antecubital (Active)   Site Assessment Clean, dry, & intact 04/28/22 0656   Phlebitis Assessment 0 04/28/22 0656   Dressing Status Clean, dry, & intact 04/28/22 0656   Dressing Type Transparent 04/28/22 0656   Hub Color/Line Status Green 04/28/22 0656   Alcohol Cap Used No 04/28/22 0656        Opportunity for questions and clarification was provided.

## 2022-04-29 ENCOUNTER — TELEPHONE (OUTPATIENT)
Dept: CASE MANAGEMENT | Age: 77
End: 2022-04-29

## 2022-04-29 DIAGNOSIS — I35.0 AORTIC VALVE STENOSIS, ETIOLOGY OF CARDIAC VALVE DISEASE UNSPECIFIED: Primary | ICD-10-CM

## 2022-04-29 NOTE — PROGRESS NOTES
Notified of new brilinta patient. 1 month script obtained & sent to [facility]. Filled & delivered to patient prior to discharge. Brilinta teaching done. All questions answered.  Voiced good understanding

## 2022-04-29 NOTE — TELEPHONE ENCOUNTER
Spoke with pt regarding plans for upcoming appt scheduled for 5/6 with arrival time @0915. Instructed pt to enter hospital on the outpatient side ([address] go to 2nd floor, and check in at radiology. Nothing to eat or drink after 0700 except sips of water with medications. TAVR protocol CT: 09:45 am    Pt verbalized understanding and contact information ([phone]) provided for any further questions.     German Smoker, Structural Heart Navigator

## 2022-04-29 NOTE — TELEPHONE ENCOUNTER
Instructions for CT were given to pt spouse, Lakeisha Michelle, who cares for pt needs due to the pt having a severe hearing impairment.  She will be with the pt when he is here for his Verlean Alert

## 2022-04-29 NOTE — PROGRESS NOTES
SAME DAY DISCHARGE FOLLOW UP PHONE CALL           NAME Bradley Cohen           :                     DATE/TIME:   22 (Norman Ramirez)                           MRN# [id number]        1. Ensure that the patient has had their new prescriptions filled & ask if they have taken their anti-platelet & aspirin that day. Pt has taken asa & brilinta today as ordered. Reinforced the importance of continuing both these medications daily & to not stop for any reason without discussing with the cardiologist first. Voiced good understanding    2. Ask about access site. Have the patient assess for any signs of a hematoma. Ask about any pain at access site. Right  Radial site doing well, denies any bleeding swelling or pain      3. Ask the patient if they had experienced any chest pain or shortness of breath. Reports feel good - denies any chest pain or shortness of breath. Occasionally has to take deep breath felt to be secondary to brilinta, but states that it is not uncomfortable.  Discussed taking brilinta with caffeine & discussed if worsens or does not improve to notify MD. Voiced good understanding

## 2022-05-06 ENCOUNTER — HOSPITAL ENCOUNTER (OUTPATIENT)
Dept: CT IMAGING | Age: 77
Discharge: HOME | End: 2022-05-06
Attending: INTERNAL MEDICINE
Payer: MEDICARE

## 2022-05-06 DIAGNOSIS — I35.0 AORTIC VALVE STENOSIS, ETIOLOGY OF CARDIAC VALVE DISEASE UNSPECIFIED: ICD-10-CM

## 2022-05-06 LAB — CREAT BLD-MCNC: 0.84 MG/DL (ref 0.8–1.5)

## 2022-05-06 PROCEDURE — 74011000258 HC RX REV CODE- 258: Performed by: INTERNAL MEDICINE

## 2022-05-06 PROCEDURE — 82565 ASSAY OF CREATININE: CPT

## 2022-05-06 PROCEDURE — 74011000636 HC RX REV CODE- 636: Performed by: INTERNAL MEDICINE

## 2022-05-06 PROCEDURE — 70498 CT ANGIOGRAPHY NECK: CPT

## 2022-05-06 PROCEDURE — 75574 CT ANGIO HRT W/3D IMAGE: CPT

## 2022-05-06 RX ORDER — SODIUM CHLORIDE 0.9 % (FLUSH) 0.9 %
10 SYRINGE (ML) INJECTION
Status: COMPLETED | OUTPATIENT
Start: 2022-05-06 | End: 2022-05-06

## 2022-05-06 RX ADMIN — Medication 10 ML: at 09:51

## 2022-05-06 RX ADMIN — IOPAMIDOL 100 ML: 755 INJECTION, SOLUTION INTRAVENOUS at 09:51

## 2022-05-06 RX ADMIN — SODIUM CHLORIDE 100 ML: 9 INJECTION, SOLUTION INTRAVENOUS at 09:51

## 2022-05-06 NOTE — NURSE NAVIGATOR
Educational information/pamphlet provided to the pt and spouse regarding aortic stenosis and treatment options (SAVR and TAVR). Provided pt the CardioSmart tool for review as the shared decision making process continues between pt and valve team physicians. Also explained that the CT today will evaluate the pt for a potential TAVR/SAVR. Contact information provided for any further questions.     Xochitl Dave, Structural Heart Navigator

## 2022-05-09 PROCEDURE — 75574 CT ANGIO HRT W/3D IMAGE: CPT | Performed by: INTERNAL MEDICINE

## 2022-05-09 PROCEDURE — 75571 CT HRT W/O DYE W/CA TEST: CPT | Performed by: INTERNAL MEDICINE

## 2022-05-18 PROBLEM — I35.0 NONRHEUMATIC AORTIC VALVE STENOSIS: Status: ACTIVE | Noted: 2022-05-18

## 2022-05-18 PROBLEM — I25.10 CORONARY ARTERY DISEASE INVOLVING NATIVE CORONARY ARTERY OF NATIVE HEART WITHOUT ANGINA PECTORIS: Status: ACTIVE | Noted: 2022-05-18

## 2022-05-19 ENCOUNTER — TELEPHONE (OUTPATIENT)
Dept: CASE MANAGEMENT | Age: 77
End: 2022-05-19

## 2022-05-19 NOTE — TELEPHONE ENCOUNTER
Spoke with spouse, nirmal, pt is deaf. Also emailed instructions to email on file per her request.    Preadmission Testing for TAVR  Name: Shanita Rocha  When: June 6, 2022  Time: Arrive no later than 7:30 am  Where: [address]  Instructions:          Eat breakfast before arrival, make sure your blood sugar is well controlled      Take your morning medications before you arrive      Bring your medications in the bottles or a list with the dosages so we can verify them      Bring someone with you to the visit if available      Bring your vaccine cards, insurance cards, s license, [facility] [address] of  or    Living Will      Plan for a 2hour visit  At this appointment you will talk to anesthesia and they will tell you the anesthesia plan for your surgery, you will have non fasting labs drawn, chest X-ray completed, EKG, and a breathing test (pulmonary function test) completed. Transcatheter Aortic Valve Replacement (TAVR)   When: June 7, 2022  Continue aspirin and Brilinta everyday including the day of surgery. Arrival time: Someone from preop will call you on Monday, the day prior to your procedure, around lunch to let you know exactly what time to be at the hospital on Tuesday. Where: On Tuesday, you will come to the hospital ([address] at the front entrance ([place] [address]), check in at registration on the left just inside the doors, and they will direct you where to go. Plan to stay at least 1 night, maybe 2.        Call Shirley Prime for any questions [phone]

## 2022-05-23 ENCOUNTER — TELEPHONE (OUTPATIENT)
Dept: CASE MANAGEMENT | Age: 77
End: 2022-05-23

## 2022-05-23 NOTE — TELEPHONE ENCOUNTER
Spoke to pt spouse regarding plans for upcoming pre assessment visit and TAVR date, pt is deaf. Preadmission Testing for TAVR  Name:   When: June 6, 2022  Time: Arrive no later than 7:30 am  Where: [address]  Instructions:          Eat breakfast before arrival, make sure your blood sugar is well controlled      Take your morning medications before you arrive      Bring your medications in the bottles or a list with the dosages so we can verify them      Bring someone with you to the visit if available      Bring your insurance cards, 's license, [address] of  or Living Will      Plan for a 2hour visit  At this appointment you will talk to anesthesia and they will tell you the anesthesia plan for your surgery, you will have non fasting labs drawn, chest X-ray completed, EKG, and a breathing test (pulmonary function test) completed. Transcatheter Aortic Valve Replacement (TAVR)   When: June 7, 2022  Arrival time: Someone from preop will call you on Monday, the day prior to your procedure, around lunch to let you know exactly what time to be at the hospital on Tuesday. Where: On Tuesday, you will come to the hospital ([address] at the front entrance (the [place] [address]), check in at registration on the left just inside the doors, and they will direct you where to go. Plan to stay at least 1 night, maybe 2.        Call Aparna Christy for any questions [address], Structural Heart Navigator

## 2022-06-02 RX ORDER — CHLORHEXIDINE GLUCONATE 4 G/100ML
SOLUTION TOPICAL 2 TIMES DAILY
Status: CANCELLED | OUTPATIENT
Start: 2022-06-06

## 2022-06-06 ENCOUNTER — HOSPITAL ENCOUNTER (OUTPATIENT)
Dept: PREADMISSION TESTING | Age: 77
Discharge: HOME | DRG: 267 | End: 2022-06-09
Payer: MEDICARE

## 2022-06-06 ENCOUNTER — HOSPITAL ENCOUNTER (OUTPATIENT)
Dept: GENERAL RADIOLOGY | Age: 77
Discharge: HOME | End: 2022-06-09

## 2022-06-06 VITALS
SYSTOLIC BLOOD PRESSURE: 118 MMHG | BODY MASS INDEX: 33.46 KG/M2 | HEIGHT: 67 IN | OXYGEN SATURATION: 97 % | RESPIRATION RATE: 18 BRPM | HEART RATE: 56 BPM | DIASTOLIC BLOOD PRESSURE: 72 MMHG | TEMPERATURE: 97.5 F | WEIGHT: 213.22 LBS

## 2022-06-06 DIAGNOSIS — Z01.818 PRE-OP EXAM: ICD-10-CM

## 2022-06-06 LAB
ALBUMIN SERPL-MCNC: 3.6 G/DL (ref 3.2–4.6)
ALBUMIN/GLOB SERPL: 0.8 {RATIO} (ref 1.2–3.5)
ALP SERPL-CCNC: 70 U/L (ref 50–136)
ALT SERPL-CCNC: 24 U/L (ref 12–65)
ANION GAP SERPL CALC-SCNC: 8 MMOL/L (ref 7–16)
APPEARANCE UR: CLEAR
AST SERPL-CCNC: 17 U/L (ref 15–37)
BACTERIA URNS QL MICRO: 0 /HPF
BILIRUB SERPL-MCNC: 0.5 MG/DL (ref 0.2–1.1)
BILIRUB UR QL: NEGATIVE
BUN SERPL-MCNC: 16 MG/DL (ref 8–23)
CALCIUM SERPL-MCNC: 9.5 MG/DL (ref 8.3–10.4)
CASTS URNS QL MICRO: 0 /LPF
CHLORIDE SERPL-SCNC: 107 MMOL/L (ref 98–107)
CO2 SERPL-SCNC: 25 MMOL/L (ref 21–32)
COLOR UR: YELLOW
CREAT SERPL-MCNC: 0.77 MG/DL (ref 0.8–1.5)
EKG ATRIAL RATE: 49 BPM
EKG DIAGNOSIS: NORMAL
EKG P AXIS: -26 DEGREES
EKG P-R INTERVAL: 200 MS
EKG Q-T INTERVAL: 486 MS
EKG QRS DURATION: 154 MS
EKG QTC CALCULATION (BAZETT): 439 MS
EKG R AXIS: -15 DEGREES
EKG T AXIS: 161 DEGREES
EKG VENTRICULAR RATE: 49 BPM
EPI CELLS #/AREA URNS HPF: 0 /HPF
ERYTHROCYTE [DISTWIDTH] IN BLOOD BY AUTOMATED COUNT: 12.6 % (ref 11.9–14.6)
EST. AVERAGE GLUCOSE BLD GHB EST-MCNC: 114 MG/DL
GLOBULIN SER CALC-MCNC: 4.4 G/DL (ref 2.3–3.5)
GLUCOSE SERPL-MCNC: 100 MG/DL (ref 65–100)
GLUCOSE UR STRIP.AUTO-MCNC: NEGATIVE MG/DL
HBA1C MFR BLD: 5.6 % (ref 4.2–6.3)
HCT VFR BLD AUTO: 40.3 % (ref 41.1–50.3)
HGB BLD-MCNC: 13.7 G/DL (ref 13.6–17.2)
HGB UR QL STRIP: ABNORMAL
HISTORY CHECK: NORMAL
INR PPP: 1
KETONES UR QL STRIP.AUTO: NEGATIVE MG/DL
LEUKOCYTE ESTERASE UR QL STRIP.AUTO: NEGATIVE
MAGNESIUM SERPL-MCNC: 2.4 MG/DL (ref 1.8–2.4)
MCH RBC QN AUTO: 32.7 PG (ref 26.1–32.9)
MCHC RBC AUTO-ENTMCNC: 34 G/DL (ref 31.4–35)
MCV RBC AUTO: 96.2 FL (ref 79.6–97.8)
NITRITE UR QL STRIP.AUTO: NEGATIVE
NRBC # BLD: 0 K/UL (ref 0–0.2)
PH UR STRIP: 6.5 [PH] (ref 5–9)
PLATELET # BLD AUTO: 313 K/UL (ref 150–450)
PMV BLD AUTO: 9 FL (ref 9.4–12.3)
POTASSIUM SERPL-SCNC: 3.7 MMOL/L (ref 3.5–5.1)
PROT SERPL-MCNC: 8 G/DL (ref 6.3–8.2)
PROT UR STRIP-MCNC: NEGATIVE MG/DL
PROTHROMBIN TIME: 13.3 SEC (ref 12.6–14.5)
RBC # BLD AUTO: 4.19 M/UL (ref 4.23–5.6)
RBC #/AREA URNS HPF: ABNORMAL /HPF
SODIUM SERPL-SCNC: 140 MMOL/L (ref 136–145)
SP GR UR REFRACTOMETRY: 1.01 (ref 1–1.02)
UROBILINOGEN UR QL STRIP.AUTO: 0.2 EU/DL (ref 0.2–1)
WBC # BLD AUTO: 5.8 K/UL (ref 4.3–11.1)
WBC URNS QL MICRO: ABNORMAL /HPF

## 2022-06-06 PROCEDURE — 85027 COMPLETE CBC AUTOMATED: CPT

## 2022-06-06 PROCEDURE — 80053 COMPREHEN METABOLIC PANEL: CPT

## 2022-06-06 PROCEDURE — 71046 X-RAY EXAM CHEST 2 VIEWS: CPT

## 2022-06-06 PROCEDURE — 85610 PROTHROMBIN TIME: CPT

## 2022-06-06 PROCEDURE — 81001 URINALYSIS AUTO W/SCOPE: CPT

## 2022-06-06 PROCEDURE — 83036 HEMOGLOBIN GLYCOSYLATED A1C: CPT

## 2022-06-06 PROCEDURE — 86901 BLOOD TYPING SEROLOGIC RH(D): CPT

## 2022-06-06 PROCEDURE — 83735 ASSAY OF MAGNESIUM: CPT

## 2022-06-06 PROCEDURE — 87086 URINE CULTURE/COLONY COUNT: CPT

## 2022-06-06 PROCEDURE — 93005 ELECTROCARDIOGRAM TRACING: CPT | Performed by: PHYSICIAN ASSISTANT

## 2022-06-06 PROCEDURE — 86920 COMPATIBILITY TEST SPIN: CPT

## 2022-06-06 RX ORDER — LISINOPRIL AND HYDROCHLOROTHIAZIDE 25; 20 MG/1; MG/1
1 TABLET ORAL DAILY
COMMUNITY
End: 2022-06-23 | Stop reason: SDUPTHER

## 2022-06-06 RX ORDER — ASPIRIN 81 MG/1
81 TABLET ORAL DAILY
COMMUNITY

## 2022-06-06 RX ORDER — PITAVASTATIN CALCIUM 4.18 MG/1
4 TABLET, FILM COATED ORAL DAILY
COMMUNITY
End: 2022-06-23 | Stop reason: SDUPTHER

## 2022-06-06 RX ORDER — CETIRIZINE HYDROCHLORIDE 10 MG/1
10 TABLET ORAL DAILY
COMMUNITY

## 2022-06-06 ASSESSMENT — PAIN SCALES - GENERAL: PAINLEVEL_OUTOF10: 0

## 2022-06-06 NOTE — PRE-PROCEDURE INSTRUCTIONS
Patient verified name and . Patient provided medical/health information and PTA medications to the best of their ability. TYPE  CASE: 3  Order for consent was found in EHR and matches case posting. Labs per surgeon: cbc, cmp, hgba1c, mag, pt, urine, type and cross,   c xray collected and results in [facility] care. Urine cx results pending     Labs per anesthesia protocol no additional  EKG:  EKG collected. Patient has hx of CAD, and stent placement. Echo from 2022 and results in [facility]    Patient provided with and instructed on educational handouts including  blood transfusions, pain management, and hand hygiene for the family and community, and [place] [address] [facility] brochure. Instructed that family must be present in building at all times. Incentive spirometry instrcutioncompleted. Instructed on chest-xray procedure  Instructed on type and cross match procedure and not to remove  blood bank bracelet. Instructed on medications- Mupirocin nasal ointment 22g  prescription called into [facility] pharmacy at [address] [place] [address] both nostril twice a day, Surgery noted to be in am    Surgical skin cleanser provided and instructions given per hospital policy. Original medication prescription bottles not  visualized during patient appointment. Patient teach back successful and patient demonstrates knowledge of instruction. PLEASE CONTINUE TAKING ALL PRESCRIPTION MEDICATIONS UP TO THE DAY OF SURGERY UNLESS OTHERWISE DIRECTED BELOW. DISCONTINUE all vitamins and supplements  prior to surgery. DISCONTINUE Non-Steriodal Anti-Inflammatory (NSAIDS) such as Advil and Aleve 5 days prior to surgery.      Home Medications to take  the day of surgery      BRILLINTA     ASPIRIN     ZYRTEC     Home Medications   to Hold   HOLD LISINOPRIL-HYDROCHLOROTHIAZIDE MORNING OF SURGERY   HOLD LIVALO MORNING OF SURGERY     Comments      MUPIROCIN NASAL OINTMENT APPLY BOTH NOSTRIL TWICE TODAY     On the day before surgery please take Acetaminophen 1000mg in the morning and then again before bed. Please do not bring home medications with you on the day of surgery unless otherwise directed by your nurse. If you are instructed to bring home medications, please give them to your nurse as they will be administered by the nursing staff. If you have any questions, please call [facility] Isaura Whitten [phone] or [address] [place] [address] [phone]. A copy of this note was provided to the patient for reference.

## 2022-06-06 NOTE — PERIOP NOTE
Arrival info given to patient wife, to be here at [facility]. Denies any questions presently.

## 2022-06-07 ENCOUNTER — ANESTHESIA (OUTPATIENT)
Dept: SURGERY | Age: 77
DRG: 267 | End: 2022-06-07
Payer: MEDICARE

## 2022-06-07 ENCOUNTER — HOSPITAL ENCOUNTER (INPATIENT)
Age: 77
LOS: 1 days | Discharge: HOME | DRG: 267 | End: 2022-06-08
Attending: INTERNAL MEDICINE | Admitting: INTERNAL MEDICINE
Payer: MEDICARE

## 2022-06-07 ENCOUNTER — APPOINTMENT (OUTPATIENT)
Dept: NON INVASIVE DIAGNOSTICS | Age: 77
DRG: 267 | End: 2022-06-07
Attending: INTERNAL MEDICINE
Payer: MEDICARE

## 2022-06-07 ENCOUNTER — ANESTHESIA EVENT (OUTPATIENT)
Dept: SURGERY | Age: 77
DRG: 267 | End: 2022-06-07
Payer: MEDICARE

## 2022-06-07 DIAGNOSIS — I35.0 AS (AORTIC STENOSIS): ICD-10-CM

## 2022-06-07 DIAGNOSIS — I35.0 AORTIC VALVE STENOSIS, ETIOLOGY OF CARDIAC VALVE DISEASE UNSPECIFIED: ICD-10-CM

## 2022-06-07 DIAGNOSIS — Z95.2 S/P TAVR (TRANSCATHETER AORTIC VALVE REPLACEMENT): ICD-10-CM

## 2022-06-07 LAB
ACT AVERAGE - AAVG: 347 SEC
BASE EXCESS BLD CALC-SCNC: 0.4 MMOL/L
BASE EXCESS BLD CALC-SCNC: 0.5 MMOL/L
BASELINE ACT: 146 SEC
BASELINE ACT: 146 SEC
CA-I BLD-MCNC: 1.17 MMOL/L (ref 1.12–1.32)
CA-I BLD-MCNC: 1.17 MMOL/L (ref 1.12–1.32)
CO2 BLD-SCNC: 26 MMOL/L (ref 13–23)
CO2 BLD-SCNC: 27 MMOL/L (ref 13–23)
ECHO AO ROOT DIAM: 4 CM
ECHO AO ROOT INDEX: 1.93 CM/M2
ECHO AV ACCELERATION TIME: 95 MS
ECHO AV AREA PEAK VELOCITY: 2.1 CM2
ECHO AV AREA VTI: 2.1 CM2
ECHO AV AREA/BSA PEAK VELOCITY: 1 CM2/M2
ECHO AV AREA/BSA VTI: 1 CM2/M2
ECHO AV MEAN GRADIENT: 5 MMHG
ECHO AV MEAN VELOCITY: 1 M/S
ECHO AV PEAK GRADIENT: 10 MMHG
ECHO AV PEAK VELOCITY: 1.6 M/S
ECHO AV VELOCITY RATIO: 0.63
ECHO AV VTI: 37.2 CM
ECHO BSA: 2.13 M2
ECHO LVOT AREA: 3.5 CM2
ECHO LVOT AV VTI INDEX: 0.59
ECHO LVOT DIAM: 2.1 CM
ECHO LVOT MEAN GRADIENT: 2 MMHG
ECHO LVOT PEAK GRADIENT: 4 MMHG
ECHO LVOT PEAK VELOCITY: 1 M/S
ECHO LVOT STROKE VOLUME INDEX: 36.8 ML/M2
ECHO LVOT SV: 76.2 ML
ECHO LVOT VTI: 22 CM
EKG ATRIAL RATE: 43 BPM
EKG DIAGNOSIS: NORMAL
EKG P AXIS: 30 DEGREES
EKG P-R INTERVAL: 222 MS
EKG Q-T INTERVAL: 562 MS
EKG QRS DURATION: 154 MS
EKG QTC CALCULATION (BAZETT): 474 MS
EKG R AXIS: -3 DEGREES
EKG T AXIS: 239 DEGREES
EKG VENTRICULAR RATE: 43 BPM
GLUCOSE BLD STRIP.AUTO-MCNC: 123 MG/DL (ref 65–100)
GLUCOSE BLD STRIP.AUTO-MCNC: 130 MG/DL (ref 65–100)
HCO3 BLD-SCNC: 25.4 MMOL/L (ref 22–26)
HCO3 BLD-SCNC: 26.9 MMOL/L (ref 22–26)
HEPARIN BOLUS-PATIENT: NORMAL UNITS
HEPARIN BOLUS-PATIENT: NORMAL UNITS
HEPARIN BOLUS-PUMP: 0 UNITS
HEPARIN BOLUS-PUMP: 0 UNITS
HEPARIN BOLUS-TOTAL: NORMAL UNITS
HEPARIN BOLUS-TOTAL: NORMAL UNITS
LV EF: 43 %
LVEF MODALITY: NORMAL
NT PRO BNP: 242 PG/ML (ref 5–125)
PCO2 BLD: 41.1 MMHG (ref 35–45)
PCO2 BLD: 49.9 MMHG (ref 35–45)
PH BLD: 7.34 [PH] (ref 7.35–7.45)
PH BLD: 7.4 [PH] (ref 7.35–7.45)
PO2 BLD: 225 MMHG (ref 75–100)
PO2 BLD: 343 MMHG (ref 75–100)
POTASSIUM BLD-SCNC: 3.7 MMOL/L (ref 3.5–5.1)
POTASSIUM BLD-SCNC: 3.8 MMOL/L (ref 3.5–5.1)
PROJECTED HEPARIN CONCENTATION: 1.3 MG/KG
PROJECTED HEPARIN CONCENTATION: 2.2 MG/KG
SAO2 % BLD: 100 %
SAO2 % BLD: 100 %
SERVICE CMNT-IMP: ABNORMAL
SERVICE CMNT-IMP: ABNORMAL
SLOPE: 114
SLOPE: 114
SODIUM BLD-SCNC: 142 MMOL/L (ref 136–145)
SODIUM BLD-SCNC: 142 MMOL/L (ref 136–145)
SPECIMEN SITE: ABNORMAL
SPECIMEN SITE: ABNORMAL

## 2022-06-07 PROCEDURE — 6360000002 HC RX W HCPCS: Performed by: INTERNAL MEDICINE

## 2022-06-07 PROCEDURE — 2580000003 HC RX 258: Performed by: NURSE ANESTHETIST, CERTIFIED REGISTERED

## 2022-06-07 PROCEDURE — 7100000001 HC PACU RECOVERY - ADDTL 15 MIN: Performed by: INTERNAL MEDICINE

## 2022-06-07 PROCEDURE — 02HK3NZ INSERTION OF INTRACARDIAC PACEMAKER INTO RIGHT VENTRICLE, PERCUTANEOUS APPROACH: ICD-10-PCS | Performed by: INTERNAL MEDICINE

## 2022-06-07 PROCEDURE — 93005 ELECTROCARDIOGRAM TRACING: CPT | Performed by: INTERNAL MEDICINE

## 2022-06-07 PROCEDURE — 93325 DOPPLER ECHO COLOR FLOW MAPG: CPT | Performed by: INTERNAL MEDICINE

## 2022-06-07 PROCEDURE — 6370000000 HC RX 637 (ALT 250 FOR IP): Performed by: PHYSICIAN ASSISTANT

## 2022-06-07 PROCEDURE — 2500000003 HC RX 250 WO HCPCS: Performed by: INTERNAL MEDICINE

## 2022-06-07 PROCEDURE — 6360000002 HC RX W HCPCS: Performed by: PHYSICIAN ASSISTANT

## 2022-06-07 PROCEDURE — 2700000000 HC OXYGEN THERAPY PER DAY

## 2022-06-07 PROCEDURE — 6370000000 HC RX 637 (ALT 250 FOR IP): Performed by: ANESTHESIOLOGY

## 2022-06-07 PROCEDURE — 2500000003 HC RX 250 WO HCPCS: Performed by: NURSE ANESTHETIST, CERTIFIED REGISTERED

## 2022-06-07 PROCEDURE — 84132 ASSAY OF SERUM POTASSIUM: CPT

## 2022-06-07 PROCEDURE — C1760 CLOSURE DEV, VASC: HCPCS | Performed by: INTERNAL MEDICINE

## 2022-06-07 PROCEDURE — 6370000000 HC RX 637 (ALT 250 FOR IP): Performed by: INTERNAL MEDICINE

## 2022-06-07 PROCEDURE — 33370 TCAT PLMT&RMVL CEPD PERQ: CPT | Performed by: INTERNAL MEDICINE

## 2022-06-07 PROCEDURE — 93321 DOPPLER ECHO F-UP/LMTD STD: CPT | Performed by: INTERNAL MEDICINE

## 2022-06-07 PROCEDURE — 2500000003 HC RX 250 WO HCPCS: Performed by: PHYSICIAN ASSISTANT

## 2022-06-07 PROCEDURE — C1884 EMBOLIZATION PROTECT SYST: HCPCS | Performed by: INTERNAL MEDICINE

## 2022-06-07 PROCEDURE — C1894 INTRO/SHEATH, NON-LASER: HCPCS | Performed by: INTERNAL MEDICINE

## 2022-06-07 PROCEDURE — 2580000003 HC RX 258: Performed by: INTERNAL MEDICINE

## 2022-06-07 PROCEDURE — C1769 GUIDE WIRE: HCPCS | Performed by: INTERNAL MEDICINE

## 2022-06-07 PROCEDURE — 7100000000 HC PACU RECOVERY - FIRST 15 MIN: Performed by: INTERNAL MEDICINE

## 2022-06-07 PROCEDURE — 2709999900 HC NON-CHARGEABLE SUPPLY: Performed by: INTERNAL MEDICINE

## 2022-06-07 PROCEDURE — 2780000006 HC MISC HEART VALVE: Performed by: INTERNAL MEDICINE

## 2022-06-07 PROCEDURE — 3600000017 HC SURGERY HYBRID ADDL 15MIN: Performed by: INTERNAL MEDICINE

## 2022-06-07 PROCEDURE — 3700000001 HC ADD 15 MINUTES (ANESTHESIA): Performed by: INTERNAL MEDICINE

## 2022-06-07 PROCEDURE — 1100000003 HC PRIVATE W/ TELEMETRY

## 2022-06-07 PROCEDURE — 85347 COAGULATION TIME ACTIVATED: CPT

## 2022-06-07 PROCEDURE — 83880 ASSAY OF NATRIURETIC PEPTIDE: CPT

## 2022-06-07 PROCEDURE — 93308 TTE F-UP OR LMTD: CPT | Performed by: INTERNAL MEDICINE

## 2022-06-07 PROCEDURE — 3600000007 HC SURGERY HYBRID BASE: Performed by: INTERNAL MEDICINE

## 2022-06-07 PROCEDURE — 85520 HEPARIN ASSAY: CPT

## 2022-06-07 PROCEDURE — 6360000002 HC RX W HCPCS: Performed by: ANESTHESIOLOGY

## 2022-06-07 PROCEDURE — 93321 DOPPLER ECHO F-UP/LMTD STD: CPT

## 2022-06-07 PROCEDURE — 82947 ASSAY GLUCOSE BLOOD QUANT: CPT

## 2022-06-07 PROCEDURE — 33361 REPLACE AORTIC VALVE PERQ: CPT | Performed by: THORACIC SURGERY (CARDIOTHORACIC VASCULAR SURGERY)

## 2022-06-07 PROCEDURE — 03HY32Z INSERTION OF MONITORING DEVICE INTO UPPER ARTERY, PERCUTANEOUS APPROACH: ICD-10-PCS | Performed by: NURSE ANESTHETIST, CERTIFIED REGISTERED

## 2022-06-07 PROCEDURE — 3700000000 HC ANESTHESIA ATTENDED CARE: Performed by: INTERNAL MEDICINE

## 2022-06-07 PROCEDURE — 02RF38Z REPLACEMENT OF AORTIC VALVE WITH ZOOPLASTIC TISSUE, PERCUTANEOUS APPROACH: ICD-10-PCS | Performed by: INTERNAL MEDICINE

## 2022-06-07 PROCEDURE — 82803 BLOOD GASES ANY COMBINATION: CPT

## 2022-06-07 PROCEDURE — 6360000002 HC RX W HCPCS: Performed by: NURSE ANESTHETIST, CERTIFIED REGISTERED

## 2022-06-07 PROCEDURE — 2580000003 HC RX 258: Performed by: ANESTHESIOLOGY

## 2022-06-07 PROCEDURE — 33361 REPLACE AORTIC VALVE PERQ: CPT | Performed by: INTERNAL MEDICINE

## 2022-06-07 DEVICE — VALVE HRT TRANSCATH 29MM SAPIEN 3 ULTRA: Type: IMPLANTABLE DEVICE | Site: AORTIC VALVE | Status: FUNCTIONAL

## 2022-06-07 DEVICE — ANGIO-SEAL VIP VASCULAR CLOSURE DEVICE
Type: IMPLANTABLE DEVICE | Status: FUNCTIONAL
Brand: ANGIO-SEAL

## 2022-06-07 RX ORDER — DIPHENHYDRAMINE HYDROCHLORIDE 50 MG/ML
12.5 INJECTION INTRAMUSCULAR; INTRAVENOUS
Status: DISCONTINUED | OUTPATIENT
Start: 2022-06-07 | End: 2022-06-07

## 2022-06-07 RX ORDER — FENTANYL CITRATE 50 UG/ML
100 INJECTION, SOLUTION INTRAMUSCULAR; INTRAVENOUS
Status: DISCONTINUED | OUTPATIENT
Start: 2022-06-07 | End: 2022-06-07 | Stop reason: HOSPADM

## 2022-06-07 RX ORDER — HYDROMORPHONE HYDROCHLORIDE 2 MG/ML
0.25 INJECTION, SOLUTION INTRAMUSCULAR; INTRAVENOUS; SUBCUTANEOUS EVERY 5 MIN PRN
Status: DISCONTINUED | OUTPATIENT
Start: 2022-06-07 | End: 2022-06-07

## 2022-06-07 RX ORDER — HYDROMORPHONE HYDROCHLORIDE 2 MG/ML
0.5 INJECTION, SOLUTION INTRAMUSCULAR; INTRAVENOUS; SUBCUTANEOUS EVERY 5 MIN PRN
Status: DISCONTINUED | OUTPATIENT
Start: 2022-06-07 | End: 2022-06-07

## 2022-06-07 RX ORDER — SODIUM CHLORIDE 9 MG/ML
INJECTION, SOLUTION INTRAVENOUS CONTINUOUS
Status: ACTIVE | OUTPATIENT
Start: 2022-06-07 | End: 2022-06-08

## 2022-06-07 RX ORDER — SODIUM CHLORIDE 0.9 % (FLUSH) 0.9 %
5-40 SYRINGE (ML) INJECTION EVERY 12 HOURS SCHEDULED
Status: DISCONTINUED | OUTPATIENT
Start: 2022-06-07 | End: 2022-06-07

## 2022-06-07 RX ORDER — SODIUM CHLORIDE, SODIUM LACTATE, POTASSIUM CHLORIDE, CALCIUM CHLORIDE 600; 310; 30; 20 MG/100ML; MG/100ML; MG/100ML; MG/100ML
INJECTION, SOLUTION INTRAVENOUS CONTINUOUS
Status: DISCONTINUED | OUTPATIENT
Start: 2022-06-07 | End: 2022-06-07

## 2022-06-07 RX ORDER — POLYETHYLENE GLYCOL 3350 17 G/17G
17 POWDER, FOR SOLUTION ORAL DAILY PRN
Status: DISCONTINUED | OUTPATIENT
Start: 2022-06-07 | End: 2022-06-08 | Stop reason: HOSPADM

## 2022-06-07 RX ORDER — LIDOCAINE HYDROCHLORIDE 10 MG/ML
INJECTION, SOLUTION INFILTRATION; PERINEURAL PRN
Status: DISCONTINUED | OUTPATIENT
Start: 2022-06-07 | End: 2022-06-07 | Stop reason: HOSPADM

## 2022-06-07 RX ORDER — SODIUM CHLORIDE, SODIUM LACTATE, POTASSIUM CHLORIDE, CALCIUM CHLORIDE 600; 310; 30; 20 MG/100ML; MG/100ML; MG/100ML; MG/100ML
INJECTION, SOLUTION INTRAVENOUS CONTINUOUS PRN
Status: DISCONTINUED | OUTPATIENT
Start: 2022-06-07 | End: 2022-06-07 | Stop reason: SDUPTHER

## 2022-06-07 RX ORDER — DEXMEDETOMIDINE HYDROCHLORIDE 100 UG/ML
INJECTION, SOLUTION INTRAVENOUS PRN
Status: DISCONTINUED | OUTPATIENT
Start: 2022-06-07 | End: 2022-06-07 | Stop reason: SDUPTHER

## 2022-06-07 RX ORDER — OXYCODONE HYDROCHLORIDE 5 MG/1
10 TABLET ORAL PRN
Status: DISCONTINUED | OUTPATIENT
Start: 2022-06-07 | End: 2022-06-07

## 2022-06-07 RX ORDER — ASPIRIN 81 MG/1
81 TABLET, CHEWABLE ORAL DAILY
Status: DISCONTINUED | OUTPATIENT
Start: 2022-06-07 | End: 2022-06-07 | Stop reason: SDUPTHER

## 2022-06-07 RX ORDER — ASPIRIN 81 MG/1
81 TABLET, CHEWABLE ORAL DAILY
Status: DISCONTINUED | OUTPATIENT
Start: 2022-06-08 | End: 2022-06-08 | Stop reason: HOSPADM

## 2022-06-07 RX ORDER — MAGNESIUM HYDROXIDE/ALUMINUM HYDROXICE/SIMETHICONE 120; 1200; 1200 MG/30ML; MG/30ML; MG/30ML
15 SUSPENSION ORAL EVERY 6 HOURS PRN
Status: DISCONTINUED | OUTPATIENT
Start: 2022-06-07 | End: 2022-06-08 | Stop reason: HOSPADM

## 2022-06-07 RX ORDER — HEPARIN SODIUM 1000 [USP'U]/ML
INJECTION, SOLUTION INTRAVENOUS; SUBCUTANEOUS PRN
Status: DISCONTINUED | OUTPATIENT
Start: 2022-06-07 | End: 2022-06-07 | Stop reason: SDUPTHER

## 2022-06-07 RX ORDER — ACETAMINOPHEN 325 MG/1
650 TABLET ORAL EVERY 4 HOURS PRN
Status: DISCONTINUED | OUTPATIENT
Start: 2022-06-07 | End: 2022-06-07 | Stop reason: SDUPTHER

## 2022-06-07 RX ORDER — SODIUM CHLORIDE 0.9 % (FLUSH) 0.9 %
5-40 SYRINGE (ML) INJECTION EVERY 12 HOURS SCHEDULED
Status: DISCONTINUED | OUTPATIENT
Start: 2022-06-07 | End: 2022-06-07 | Stop reason: HOSPADM

## 2022-06-07 RX ORDER — ONDANSETRON 2 MG/ML
4 INJECTION INTRAMUSCULAR; INTRAVENOUS
Status: DISCONTINUED | OUTPATIENT
Start: 2022-06-07 | End: 2022-06-07

## 2022-06-07 RX ORDER — LISINOPRIL 20 MG/1
20 TABLET ORAL DAILY
Status: DISCONTINUED | OUTPATIENT
Start: 2022-06-08 | End: 2022-06-08 | Stop reason: HOSPADM

## 2022-06-07 RX ORDER — HEPARIN SODIUM 200 [USP'U]/100ML
INJECTION, SOLUTION INTRAVENOUS CONTINUOUS PRN
Status: COMPLETED | OUTPATIENT
Start: 2022-06-07 | End: 2022-06-07

## 2022-06-07 RX ORDER — ONDANSETRON 2 MG/ML
4 INJECTION INTRAMUSCULAR; INTRAVENOUS EVERY 6 HOURS PRN
Status: DISCONTINUED | OUTPATIENT
Start: 2022-06-07 | End: 2022-06-08 | Stop reason: HOSPADM

## 2022-06-07 RX ORDER — ACETAMINOPHEN 500 MG
1000 TABLET ORAL ONCE
Status: COMPLETED | OUTPATIENT
Start: 2022-06-07 | End: 2022-06-07

## 2022-06-07 RX ORDER — DEXMEDETOMIDINE HYDROCHLORIDE 4 UG/ML
INJECTION, SOLUTION INTRAVENOUS CONTINUOUS PRN
Status: DISCONTINUED | OUTPATIENT
Start: 2022-06-07 | End: 2022-06-07 | Stop reason: SDUPTHER

## 2022-06-07 RX ORDER — SODIUM CHLORIDE 9 MG/ML
INJECTION, SOLUTION INTRAVENOUS PRN
Status: DISCONTINUED | OUTPATIENT
Start: 2022-06-07 | End: 2022-06-08 | Stop reason: HOSPADM

## 2022-06-07 RX ORDER — MIDAZOLAM HYDROCHLORIDE 2 MG/2ML
2 INJECTION, SOLUTION INTRAMUSCULAR; INTRAVENOUS
Status: COMPLETED | OUTPATIENT
Start: 2022-06-07 | End: 2022-06-07

## 2022-06-07 RX ORDER — PROTAMINE SULFATE 10 MG/ML
INJECTION, SOLUTION INTRAVENOUS PRN
Status: DISCONTINUED | OUTPATIENT
Start: 2022-06-07 | End: 2022-06-07 | Stop reason: SDUPTHER

## 2022-06-07 RX ORDER — ACETAMINOPHEN 325 MG/1
650 TABLET ORAL EVERY 6 HOURS PRN
Status: DISCONTINUED | OUTPATIENT
Start: 2022-06-07 | End: 2022-06-08 | Stop reason: HOSPADM

## 2022-06-07 RX ORDER — SODIUM CHLORIDE 0.9 % (FLUSH) 0.9 %
5-40 SYRINGE (ML) INJECTION PRN
Status: DISCONTINUED | OUTPATIENT
Start: 2022-06-07 | End: 2022-06-07 | Stop reason: HOSPADM

## 2022-06-07 RX ORDER — OXYCODONE HYDROCHLORIDE 5 MG/1
5 TABLET ORAL PRN
Status: DISCONTINUED | OUTPATIENT
Start: 2022-06-07 | End: 2022-06-07

## 2022-06-07 RX ORDER — NITROGLYCERIN 0.4 MG/1
0.4 TABLET SUBLINGUAL EVERY 5 MIN PRN
Status: DISCONTINUED | OUTPATIENT
Start: 2022-06-07 | End: 2022-06-08 | Stop reason: HOSPADM

## 2022-06-07 RX ORDER — SODIUM CHLORIDE 9 MG/ML
INJECTION, SOLUTION INTRAVENOUS CONTINUOUS
Status: ACTIVE | OUTPATIENT
Start: 2022-06-07 | End: 2022-06-07

## 2022-06-07 RX ORDER — PROPOFOL 10 MG/ML
INJECTION, EMULSION INTRAVENOUS PRN
Status: DISCONTINUED | OUTPATIENT
Start: 2022-06-07 | End: 2022-06-07 | Stop reason: SDUPTHER

## 2022-06-07 RX ORDER — SODIUM CHLORIDE 0.9 % (FLUSH) 0.9 %
5-40 SYRINGE (ML) INJECTION PRN
Status: DISCONTINUED | OUTPATIENT
Start: 2022-06-07 | End: 2022-06-08 | Stop reason: HOSPADM

## 2022-06-07 RX ORDER — SODIUM CHLORIDE, SODIUM LACTATE, POTASSIUM CHLORIDE, CALCIUM CHLORIDE 600; 310; 30; 20 MG/100ML; MG/100ML; MG/100ML; MG/100ML
INJECTION, SOLUTION INTRAVENOUS CONTINUOUS
Status: DISCONTINUED | OUTPATIENT
Start: 2022-06-07 | End: 2022-06-07 | Stop reason: HOSPADM

## 2022-06-07 RX ADMIN — DEXMEDETOMIDINE 80 MCG: 100 INJECTION, SOLUTION, CONCENTRATE INTRAVENOUS at 07:19

## 2022-06-07 RX ADMIN — PHENYLEPHRINE HYDROCHLORIDE 50 MCG: 10 INJECTION INTRAVENOUS at 08:24

## 2022-06-07 RX ADMIN — HEPARIN SODIUM 14000 UNITS: 1000 INJECTION, SOLUTION INTRAVENOUS; SUBCUTANEOUS at 08:18

## 2022-06-07 RX ADMIN — PROPOFOL 20 MG: 10 INJECTION, EMULSION INTRAVENOUS at 07:45

## 2022-06-07 RX ADMIN — PROPOFOL 30 MG: 10 INJECTION, EMULSION INTRAVENOUS at 07:58

## 2022-06-07 RX ADMIN — PROPOFOL 20 MG: 10 INJECTION, EMULSION INTRAVENOUS at 08:11

## 2022-06-07 RX ADMIN — PROPOFOL 20 MG: 10 INJECTION, EMULSION INTRAVENOUS at 08:17

## 2022-06-07 RX ADMIN — PROTAMINE SULFATE 40 MG: 10 INJECTION, SOLUTION INTRAVENOUS at 08:51

## 2022-06-07 RX ADMIN — CEFAZOLIN SODIUM 2 G: 100 INJECTION, POWDER, LYOPHILIZED, FOR SOLUTION INTRAVENOUS at 07:37

## 2022-06-07 RX ADMIN — MIDAZOLAM HYDROCHLORIDE 2 MG: 1 INJECTION, SOLUTION INTRAMUSCULAR; INTRAVENOUS at 07:09

## 2022-06-07 RX ADMIN — Medication 1 AMPULE: at 20:24

## 2022-06-07 RX ADMIN — SODIUM CHLORIDE, POTASSIUM CHLORIDE, SODIUM LACTATE AND CALCIUM CHLORIDE: 600; 310; 30; 20 INJECTION, SOLUTION INTRAVENOUS at 07:08

## 2022-06-07 RX ADMIN — SODIUM CHLORIDE, POTASSIUM CHLORIDE, SODIUM LACTATE AND CALCIUM CHLORIDE: 600; 310; 30; 20 INJECTION, SOLUTION INTRAVENOUS at 07:19

## 2022-06-07 RX ADMIN — Medication 3 AMPULE: at 07:07

## 2022-06-07 RX ADMIN — SODIUM CHLORIDE, SODIUM LACTATE, POTASSIUM CHLORIDE, CALCIUM CHLORIDE: 600; 310; 30; 20 INJECTION, SOLUTION INTRAVENOUS at 07:19

## 2022-06-07 RX ADMIN — ACETAMINOPHEN 1000 MG: 500 TABLET ORAL at 07:07

## 2022-06-07 RX ADMIN — PROPOFOL 20 MG: 10 INJECTION, EMULSION INTRAVENOUS at 08:02

## 2022-06-07 RX ADMIN — SODIUM CHLORIDE: 9 INJECTION, SOLUTION INTRAVENOUS at 18:30

## 2022-06-07 RX ADMIN — DEXMEDETOMIDINE HYDROCHLORIDE 1 MCG/KG/HR: 4 INJECTION, SOLUTION INTRAVENOUS at 07:24

## 2022-06-07 ASSESSMENT — PAIN SCALES - GENERAL
PAINLEVEL_OUTOF10: 2
PAINLEVEL_OUTOF10: 0

## 2022-06-07 ASSESSMENT — PAIN DESCRIPTION - LOCATION: LOCATION: LEG

## 2022-06-07 ASSESSMENT — PAIN - FUNCTIONAL ASSESSMENT
PAIN_FUNCTIONAL_ASSESSMENT: NONE - DENIES PAIN
PAIN_FUNCTIONAL_ASSESSMENT: NONE - DENIES PAIN

## 2022-06-07 ASSESSMENT — PAIN DESCRIPTION - ORIENTATION: ORIENTATION: RIGHT;LEFT

## 2022-06-07 NOTE — PROGRESS NOTES
TRANSFER - IN REPORT:    Verbal report received from [facility] on Betty Lester  being received from PACU for routine progression of patient care      Report consisted of patient's Situation, Background, Assessment and   Recommendations(SBAR). Information from the following report(s) Nurse Handoff Report, MAR and Cardiac Rhythm sinus bradycardia, 1st degree AV block, LBBB, arrhythmia was reviewed with the receiving nurse. Opportunity for questions and clarification was provided. Assessment completed upon patient's arrival to unit and care assumed.

## 2022-06-07 NOTE — Clinical Note
Prepped: bilateral groin and right radial. Site was marked, clipped and prepped. Prepped with: ChloraPrep. Patient was draped after wet prep solution dried.

## 2022-06-07 NOTE — PERIOP NOTE
TRANSFER - OUT REPORT:    Verbal report given to [address] [place] [address] on Priscilla Brower  being transferred to 2234 for routine post-op       Report consisted of patients Situation, Background, Assessment and   Recommendations(SBAR). Information from the following report(s) Nurse Handoff Report and Adult Overview was reviewed with the receiving nurse. Lines:   Peripheral IV 06/07/22 Left;Posterior Hand (Active)   Site Assessment Clean, dry & intact 06/07/22 1225   Line Status Infusing 06/07/22 1225   Line Care Connections checked and tightened 06/07/22 1225   Phlebitis Assessment No symptoms 06/07/22 1225   Infiltration Assessment 0 06/07/22 1225   Dressing Status Clean, dry & intact 06/07/22 1225   Dressing Type Transparent 06/07/22 1225        Opportunity for questions and clarification was provided. Patient transported with:   Monitor  O2 @ 2 liters  Registered Nurse    VTE prophylaxis orders have been written for Priscilla Brower. Patient and family given floor number and nurses name. Family updated re: pt status after security code verified.

## 2022-06-07 NOTE — ANESTHESIA PRE PROCEDURE
Department of Anesthesiology  Preprocedure Note       Name:  Loren Hendrix   Age:  78 y.o.  :  1945                                          MRN:  [id number]         Date:  2022      Surgeon: Francois Grimm):  MD Chapin Sofia MD    Procedure: Procedure(s):  TRANSCATHETER AORTIC VALVE REPLACEMENT (CATH)/ PT DEAF- NO SIGN LANGUAGE    Medications prior to admission:   Prior to Admission medications    Medication Sig Start Date End Date Taking? Authorizing Provider   mupirocin (BACTROBAN) 2 % ointment Apply topically daily Apply topically 3 times daily.    Yes Historical Provider, MD   pitavastatin (LIVALO) 4 MG TABS tablet Take 4 mg by mouth daily    Historical Provider, MD   lisinopril-hydroCHLOROthiazide (PRINZIDE;ZESTORETIC) 20-25 MG per tablet Take 1 tablet by mouth daily    Historical Provider, MD   aspirin 81 MG EC tablet Take 81 mg by mouth daily    Historical Provider, MD   Multiple Vitamin (MULTIVITAMIN PO) Take by mouth daily    Historical Provider, MD   cetirizine (ZYRTEC) 10 MG tablet Take 10 mg by mouth daily    Historical Provider, MD   VITAMIN E PO Take by mouth    Historical Provider, MD   VITAMIN D PO Take by mouth    Historical Provider, MD   Omega-3 Fatty Acids (FISH OIL PO) Take by mouth daily    Historical Provider, MD   ticagrelor (BRILINTA) 90 MG TABS tablet Take 90 mg by mouth 2 times daily    Historical Provider, MD   Cyanocobalamin (VITAMIN B-12 PO) Take by mouth daily    Historical Provider, MD       Current medications:    Current Facility-Administered Medications   Medication Dose Route Frequency Provider Last Rate Last Admin    ceFAZolin (ANCEF) 2000 mg in sterile water 20 mL IV syringe  2,000 mg IntraVENous On Call to [address] [place] [address]        alcohol 62% (NOZIN) nasal  3 ampule  3 ampule Topical Once [employer]        acetaminophen (TYLENOL) tablet 1,000 mg  1,000 mg Oral Once Rickie Otoole MD        fentaNYL (SUBLIMAZE) injection 100 mcg  100 mcg IntraVENous Once PRN Mary Garvey MD        lactated ringers infusion   IntraVENous Continuous Mary Garvey MD        sodium chloride flush 0.9 % injection 5-40 mL  5-40 mL IntraVENous 2 times per day Mary Garvey MD        sodium chloride flush 0.9 % injection 5-40 mL  5-40 mL IntraVENous PRN Mary Garvey MD        midazolam PF (VERSED) injection 2 mg  2 mg IntraVENous Once PRN Mary Garvey MD         Facility-Administered Medications Ordered in Other Encounters   Medication Dose Route Frequency Provider Last Rate Last Admin    chlorhexidine gluconate (ANTISEPTIC SKIN CLEANSER) 4 % solution   Topical BID YASIR Kwok           Allergies:     Allergies   Allergen Reactions    Epinephrine      \"nervousness\"       Problem List:    Patient Active Problem List   Diagnosis Code    LBBB (left bundle branch block) I44.7    Primary hypertension I10    Pure hypercholesterolemia E78.00    Family history of cardiovascular disease Z82.49    Bilateral deafness H91.93    Murmur R01.1    Chest discomfort R07.89    Coronary artery disease involving native coronary artery of native heart without angina pectoris I25.10    Nonrheumatic aortic valve stenosis I35.0       Past Medical History:        Diagnosis Date    Asthma     Dx as a child    CAD (coronary artery disease)     Cancer ([facility] [address]     Prostate cancer    H/O echocardiogram 04/20/2022    EF 45-50%    Hypertension     managed with med    Skin cancer     removal from face       Past Surgical History:        Procedure Laterality Date    CARDIAC SURGERY  04/28/2022    Heart cath with stent x 2 placement    CARPAL TUNNEL RELEASE      COLONOSCOPY      EYE SURGERY Bilateral     cataract removal    MOUTH SURGERY      upper inplants x 2     PROSTATECTOMY  2019    TONSILLECTOMY         Social History:    Social History     Tobacco Use    Smoking status: Never Smoker    Smokeless tobacco: Never Used   Substance Use Topics    Alcohol use: Yes     Comment: socially                                Counseling given: Not Answered      Vital Signs (Current):   Vitals:    06/07/22 0606   Pulse: 57   Resp: (!) 59   Temp: 98.5 °F (36.9 °C)   TempSrc: Oral   SpO2: 97%   Weight: 212 lb 6.4 oz (96.3 kg)   Height: 5' 7\" (1.702 m)                                              BP Readings from Last 3 Encounters:   06/06/22 118/72   05/18/22 136/64   04/20/22 120/70       NPO Status:                                                                                 BMI:   Wt Readings from Last 3 Encounters:   06/07/22 212 lb 6.4 oz (96.3 kg)   06/06/22 213 lb 3.5 oz (96.7 kg)   05/18/22 213 lb 12.8 oz (97 kg)     Body mass index is 33.27 kg/m². CBC:   Lab Results   Component Value Date    WBC 5.8 06/06/2022    RBC 4.19 06/06/2022    HGB 13.7 06/06/2022    HCT 40.3 06/06/2022    MCV 96.2 06/06/2022    RDW 12.6 06/06/2022     06/06/2022       CMP:   Lab Results   Component Value Date     06/06/2022    K 3.7 06/06/2022     06/06/2022    CO2 25 06/06/2022    BUN 16 06/06/2022    CREATININE 0.77 06/06/2022    GFRAA >60 06/06/2022    LABGLOM >60 06/06/2022    GLUCOSE 100 06/06/2022    PROT 8.0 06/06/2022    CALCIUM 9.5 06/06/2022    BILITOT 0.5 06/06/2022    ALKPHOS 70 06/06/2022    AST 17 06/06/2022    ALT 24 06/06/2022       POC Tests: No results for input(s): POCGLU, POCNA, POCK, POCCL, POCBUN, POCHEMO, POCHCT in the last 72 hours. Coags:   Lab Results   Component Value Date    PROTIME 13.3 06/06/2022    INR 1.0 06/06/2022       HCG (If Applicable): No results found for: PREGTESTUR, PREGSERUM, HCG, HCGQUANT     ABGs: No results found for: PHART, PO2ART, DUZ6QXC, FGI5VVW, BEART, L3RCMKAV     Type & Screen (If Applicable):  No results found for: LABABO, LABRH    Drug/Infectious Status (If Applicable):  No results found for: HIV, HEPCAB    COVID-19 Screening (If Applicable):  No results found for: COVID19        Anesthesia Evaluation  Patient summary reviewed and Nursing notes reviewed no history of anesthetic complications:   Airway:           Dental:          Pulmonary:                              Cardiovascular:    (+) hypertension:, CAD:, CABG/stent:, hyperlipidemia    Dysrhythmias: LBBB. Neuro/Psych:               GI/Hepatic/Renal:        Morbid obesity: obesity. Endo/Other:                      ROS comment: deaf Abdominal:             Vascular:           Other Findings:           Anesthesia Plan        Lennie Nguyen MD   6/7/2022

## 2022-06-07 NOTE — Clinical Note
Single view of the aortic root obtained using power injection. Rate = 15 mL/sec. Total volume = 20 mL.

## 2022-06-07 NOTE — PROGRESS NOTES
Operative Report    Patient: Moe Alex MRN: [id number]  SSN: [id number]    YOB: 1945  Age: 78 y.o. Sex: male       Date of Surgery: 6/7/2022     Preoperative Diagnosis: Aortic valve stenosis, etiology of cardiac valve disease unspecified [I35.0]     Postoperative Diagnosis:      Primary Cardiothoracic Surgeon: Linda Rocha MD     Primary Interventional Cardiologist: Shayna Mckeon MD    Anesthesia: General     Procedure: Procedure(s):  Transcatheter aortic valve replacement   Transcatheter aortic valve replacement (29 mm Hernandez Kwasi 3 transcatheter aortic valve via right common femoral artery percutaneous approach). Findings:  Successful deployment of a 29 mm Hernandez Kwasi 3 transcatheter aortic valve via right common femoral artery percutaneous approach. good LV function with no perivalvular regurgitation. The patient was in RBBB at the conclusion of the procedure. Indication for Procedure: The patient is a 78 y.o. male with symptomatic severe aortic stenosis. He was not felt to be a candidate for surgical aortic valve replacement and after discussion at the multidisciplinary valve board transcatheter aortic valve replacement was recommended. After a thorough discussion of all the risks and proposed benefits, the patient agreed to proceed. Procedure in Detail:   Patient premedicated and brought to the operating suite. Time-out performed. Standard monitoring lines placed, and the patient was intubated and placed under general anesthesia without event. Transesophageal echocardiogram was performed, confirming the above valve pathology. Intravenous cefazolin was administered. Patient prepped and draped in standard, meticulous surgical fashion. Ioban drapes were used. Access to the left common femoral artery and vein was obtained under ultrasound guidance, and 6-Peruvian sheaths were placed.  A temporary transvenous right ventricular pacing wire was positioned via the femoral vein. Next, a micropuncture kit was used to gain access to the right common femoral artery and a 6-Uruguayan sheath was placed. This was upsized and the 14-[place]rugu[place] Hernandez transcatheter heart valve sheath was placed after deploying Perclose devices in a pre-close fashion. Next, a balloon valvuloplasty was performed. The patient remained hemodynamically stable. The 29mm Kwasi 3 valve was then prepped and mounted onto the delivery system in the correct orientation. This was then positioned in the ascending aorta, and then advanced across the aortic valve and deployed successfully. The details of this are dictated under a separate cover in Dr. Gomez Cleaves report. Following deployment, an aortogram and transesophageal echocardiogram were performed, which demonstrated a no aortic regurgitation and a well-seated, well-functioning prosthetic valve in the aortic position. The patient remained hemodynamically stable and in RBBB. At this point, the delivery system and the sheath were removed from the right common femoral artery, and hemostasis was obtained using the Perclose sutures. Sheaths were also removed from the left common femoral artery and vein. At the conclusion of the procedure, the patient was hemodynamically stable and hemostasis was good. The patient was then transported to CCU/ICU in stable condition. At the end of the case, all sharp, sponge, and instrument counts were reported as being correct. Estimated Blood Loss:  minimal    Tourniquet Time: * No tourniquets in log *      Implants:   Implant Name Type Inv.  Item Serial No.  Lot No. LRB No. Used Action   Hernandez KWASI 3 Transcatheter Heart Valve (THV) Aortic valves  [id number] [employer]-  N/A 1 Implanted   DEVICE CLSR 6FR 0.035IN VASC V TWST INTEGR PLATFRM - [id number]  DEVICE CLSR 6FR 0.035IN VASC V TWST INTEGR PLATFRM  [employer]- [id number] N/A 1 Implanted             Specimens: * No specimens in log *        Drains: None                Complications: None    Counts: Sponge and needle counts were correct times two.     Signed By:  Sharif Anderson MD     June 7, 2022

## 2022-06-07 NOTE — ANESTHESIA PRE PROCEDURE
Department of Anesthesiology  Preprocedure Note       Name:  Laurie Anne   Age:  78 y.o.  :  1945                                          MRN:  [id number]         Date:  2022      Surgeon: Shi Shepard):  MD Radha Middleton MD    Procedure: Procedure(s):  TRANSCATHETER AORTIC VALVE REPLACEMENT (CATH)/ PT DEAF- NO SIGN LANGUAGE    Medications prior to admission:   Prior to Admission medications    Medication Sig Start Date End Date Taking? Authorizing Provider   mupirocin (BACTROBAN) 2 % ointment Apply topically daily Apply topically 3 times daily.    Yes Historical Provider, MD   pitavastatin (LIVALO) 4 MG TABS tablet Take 4 mg by mouth daily    Historical Provider, MD   lisinopril-hydroCHLOROthiazide (PRINZIDE;ZESTORETIC) 20-25 MG per tablet Take 1 tablet by mouth daily    Historical Provider, MD   aspirin 81 MG EC tablet Take 81 mg by mouth daily    Historical Provider, MD   Multiple Vitamin (MULTIVITAMIN PO) Take by mouth daily    Historical Provider, MD   cetirizine (ZYRTEC) 10 MG tablet Take 10 mg by mouth daily    Historical Provider, MD   VITAMIN E PO Take by mouth    Historical Provider, MD   VITAMIN D PO Take by mouth    Historical Provider, MD   Omega-3 Fatty Acids (FISH OIL PO) Take by mouth daily    Historical Provider, MD   ticagrelor (BRILINTA) 90 MG TABS tablet Take 90 mg by mouth 2 times daily    Historical Provider, MD   Cyanocobalamin (VITAMIN B-12 PO) Take by mouth daily    Historical Provider, MD       Current medications:    Current Facility-Administered Medications   Medication Dose Route Frequency Provider Last Rate Last Admin    ceFAZolin (ANCEF) 2000 mg in sterile water 20 mL IV syringe  2,000 mg IntraVENous On Call to [address] [place] [address]        alcohol 62% (NOZIN) nasal  3 ampule  3 ampule Topical Once [facility]        acetaminophen (TYLENOL) tablet 1,000 mg  1,000 mg Oral Once Scott Cardoso MD        fentaNYL (SUBLIMAZE) injection 100 mcg  100 mcg IntraVENous Once PRN Papo Kaye MD        lactated ringers infusion   IntraVENous Continuous Papo Kaye MD        sodium chloride flush 0.9 % injection 5-40 mL  5-40 mL IntraVENous 2 times per day Papo Kaye MD        sodium chloride flush 0.9 % injection 5-40 mL  5-40 mL IntraVENous PRN Papo Kaye MD        midazolam PF (VERSED) injection 2 mg  2 mg IntraVENous Once PRN Papo Kaye MD         Facility-Administered Medications Ordered in Other Encounters   Medication Dose Route Frequency Provider Last Rate Last Admin    chlorhexidine gluconate (ANTISEPTIC SKIN CLEANSER) 4 % solution   Topical BID YASIR Paz           Allergies:     Allergies   Allergen Reactions    Epinephrine      \"nervousness\"       Problem List:    Patient Active Problem List   Diagnosis Code    LBBB (left bundle branch block) I44.7    Primary hypertension I10    Pure hypercholesterolemia E78.00    Family history of cardiovascular disease Z82.49    Bilateral deafness H91.93    Murmur R01.1    Chest discomfort R07.89    Coronary artery disease involving native coronary artery of native heart without angina pectoris I25.10    Nonrheumatic aortic valve stenosis I35.0       Past Medical History:        Diagnosis Date    Asthma     Dx as a child    CAD (coronary artery disease)     Cancer ([facility] [address]     Prostate cancer    H/O echocardiogram 04/20/2022    EF 45-50%    Hypertension     managed with med    Skin cancer     removal from face       Past Surgical History:        Procedure Laterality Date    CARDIAC SURGERY  04/28/2022    Heart cath with stent x 2 placement    CARPAL TUNNEL RELEASE      COLONOSCOPY      EYE SURGERY Bilateral     cataract removal    MOUTH SURGERY      upper inplants x 2     PROSTATECTOMY  2019    TONSILLECTOMY         Social History:    Social History     Tobacco Use    Smoking status: Never Smoker    Smokeless tobacco: Never Used   Substance Use Topics    Alcohol use: Yes     Comment: socially                                Counseling given: Not Answered      Vital Signs (Current):   Vitals:    06/07/22 0606   Pulse: 57   Resp: (!) 59   Temp: 98.5 °F (36.9 °C)   TempSrc: Oral   SpO2: 97%   Weight: 212 lb 6.4 oz (96.3 kg)   Height: 5' 7\" (1.702 m)                                              BP Readings from Last 3 Encounters:   06/06/22 118/72   05/18/22 136/64   04/20/22 120/70       NPO Status:                                                                                 BMI:   Wt Readings from Last 3 Encounters:   06/07/22 212 lb 6.4 oz (96.3 kg)   06/06/22 213 lb 3.5 oz (96.7 kg)   05/18/22 213 lb 12.8 oz (97 kg)     Body mass index is 33.27 kg/m². CBC:   Lab Results   Component Value Date    WBC 5.8 06/06/2022    RBC 4.19 06/06/2022    HGB 13.7 06/06/2022    HCT 40.3 06/06/2022    MCV 96.2 06/06/2022    RDW 12.6 06/06/2022     06/06/2022       CMP:   Lab Results   Component Value Date     06/06/2022    K 3.7 06/06/2022     06/06/2022    CO2 25 06/06/2022    BUN 16 06/06/2022    CREATININE 0.77 06/06/2022    GFRAA >60 06/06/2022    LABGLOM >60 06/06/2022    GLUCOSE 100 06/06/2022    PROT 8.0 06/06/2022    CALCIUM 9.5 06/06/2022    BILITOT 0.5 06/06/2022    ALKPHOS 70 06/06/2022    AST 17 06/06/2022    ALT 24 06/06/2022       POC Tests: No results for input(s): POCGLU, POCNA, POCK, POCCL, POCBUN, POCHEMO, POCHCT in the last 72 hours. Coags:   Lab Results   Component Value Date    PROTIME 13.3 06/06/2022    INR 1.0 06/06/2022       HCG (If Applicable): No results found for: PREGTESTUR, PREGSERUM, HCG, HCGQUANT     ABGs: No results found for: PHART, PO2ART, GVE3CWS, WAX5YPE, BEART, [id number]     Type & Screen (If Applicable):  No results found for: LABABO, LABRH    Drug/Infectious Status (If Applicable):  No results found for: HIV, HEPCAB    COVID-19 Screening (If Applicable):  No results found for: COVID19        Anesthesia Evaluation  Patient summary reviewed and Nursing notes reviewed no history of anesthetic complications:   Airway: Mallampati: II          Dental: normal exam   (+) implants      Pulmonary: breath sounds clear to auscultation  (+) asthma (as child):                            Cardiovascular:  Exercise tolerance: poor (<4 METS),   (+) hypertension:, CAD:, CABG/stent (5/6/22 x2 ):, murmur,         Rhythm: regular  Rate: normal                 ROS comment: EF 45%  Global HK  AV peak over mean gradient 57/34  3.8 m/sec  MARILYN 0.9 cm2  CASTRO worse in last 6 months  No syncope  Upper back pain     Neuro/Psych:   Negative Neuro/Psych ROS              GI/Hepatic/Renal: Neg GI/Hepatic/Renal ROS       Morbid obesity: obesity. Endo/Other: Negative Endo/Other ROS                    Abdominal:             Vascular: negative vascular ROS. Other Findings:           Anesthesia Plan      TIVA     ASA 3       Induction: intravenous. arterial line    Anesthetic plan and risks discussed with patient and spouse.                         Suresh Singh MD   6/7/2022

## 2022-06-07 NOTE — PLAN OF CARE
[place] Discharge Assessment





- Basic Data


Infant Gender: Female


Birth Date and Time: 2016  08[birth year]


Mother's Race/Ethnicity: White


Fathers Race/Ethnicity: White


Gestational Age by Date: 40 0/7


Gestational Age by Exam: 1


Maturity Rating Score: 35


Maturity Rating Weeks: 38 WEEKS





- Mother's Lab Work


Rubella Status: Unknown


Serology: Negative


Hepititis B Status: Unknown


HIV Status: Negative


Group B Strep Status: Unknown


GC/Chlamydia: Unknown





- Medications Given


Medications Given: 


 Medications Given





Dextrose (Dextrose 10%)  13.2 ml IV PRN PRN


   PRN Reason: HYPOGLYCEMIA (LOW BLOOD SUGAR)


   Last Admin: 18 10:36  Dose: 13.2 ml





Miscellaneous (Otbs (One-Touch Blood Sugar))  1 ea XX PRN PRN


   PRN Reason: PER PROTOCOL


   Last Admin: 18 10:04  Dose: 1 ea





MAR Blood Glucose


 Document     18 10:04  AHICKOX  (Rec: 18 10:04  AHICKOX  

[email or web address])


     Blood Glucose


      Blood Glucose (65-95mg/dl)                 104








Discontinued Medications





Erythromycin (Ilotycin Ophth Oint)  1 applic EACHEYE ON CALL ONE


   Stop: 18 09:34


   Last Admin: 18 08:35  Dose: 1 applic





Hepatitis B Vaccine (Engerix-B Pediatric 1 Dose)  10 mcg IM .ONCE ONE


   Stop: 18 09:34


   Last Admin: 18 11:00  Dose: 10 mcg





Immunization


 Document     18 11:00  LBECKI  (Rec: 18 17:14  LBECKI  [email or web address])


     Immunization Questions


      Patient provided approval for              Yes


       administration of vaccination             


      Opt out of sending immunization data to    No


       repository?                               


      Suppress immunization data to other        No


       providers from registry?                  


      VIS Given Date                             18


      Mother's First Name                        KENDRA


     Vaccine Funding Eligibilty


      Vaccination Eligibility                    Not VFC eligible


MAR Injection Site


 Document     18 11:00  LBECKI  (Rec: 18 17:14  LBECKI  [email or web address])


     Injection Site


      MAR Injection Site                         Left Vastus Lateralis





Phytonadione (Aqua-Mephyton *)  1 mg IM ON CALL ONE


   Stop: 18 09:34


   Last Admin: 18 08:35  Dose: 1 mg





MAR Injection Site


 Document     18 08:35  LBECKI  (Rec: 18 11:18  LBECKI  [email or web address])


     Injection Site


      MAR Injection Site                         Right Vastus Lateralis














- Labs


Infant Labs: 


 [facility]











Cord Blood Type  O POSITIVE   18  09:35    


 


Total Bilirubin  11.00 mg/dL (0-11.7)   18  10:26    


 


Direct Bilirubin  0.16 mg/dL (0-0.6)   18  09:10    


 


Indirect Bilirubin  5.54 mg/dL (0-5.8)   18  09:10    


 


PKU   To follow   18  08:30    














- Vital Signs


Temperature: 98.3 F


Respiratory Rate: 52


O2 Sat by Pulse Oximetry: 100





- Birthweight


Discharge Weight: 8 lb 3 oz





- Physical Exam


Head/Neck: Normal (baby thriving.  d/c good condition)


Eyes: Normal


ENT: Normal


Breath Sounds: Normal


Thorax: Normal


Clavicles: Normal


Heart Sounds: Normal


Pulses: Normal


Abdomen: Normal


Cord: Normal


Genitalia: Normal


Anus: Normal


Skeletal/Joints: Normal


Neurologic/Reflexes: Normal


Cry: Normal


Muscle Tone: Normal


Skin: color,lesions: Normal


Behavior: Normal


Elimination: Normal





- Problems Identified


Patient Problems: 


 Problems





Jaundice,  (Acute) P59.9


Single liveborn infant, delivered by  (Acute) Z38.01


TTN (transient tachypnea of ) (Acute) P22.1 Problem: Safety - Adult  Goal: Free from fall injury  Outcome: Progressing     Problem: Pain  Goal: Verbalizes/displays adequate comfort level or baseline comfort level  Outcome: Progressing     Problem: Discharge Planning  Goal: Discharge to home or other facility with appropriate resources  Outcome: Progressing

## 2022-06-07 NOTE — ANESTHESIA POSTPROCEDURE EVALUATION
Department of Anesthesiology  Postprocedure Note    Patient: Allan Childers  MRN: [id number]  Birthdate: [birth year]  Date of evaluation: 6/7/2022  Time:  11:53 AM     Procedure Summary     Date: 06/07/22 Room / Location: [facility] MAIN OR [address] [place] [address] MAIN OR    Anesthesia Start: 0719 Anesthesia Stop: 2601    Procedure: Transcatheter aortic valve replacement (N/A Chest) Diagnosis:       Aortic valve stenosis, etiology of cardiac valve disease unspecified      (Aortic Stenosis. )    Providers: Rosario Robbins MD Responsible Provider: Chas Tavarez MD    Anesthesia Type: TIVA ASA Status: 3          Anesthesia Type: No value filed. Annabelle Phase I: Annabelle Score: 8    Annabelle Phase II:      Last vitals: Reviewed and per EMR flowsheets. Anesthesia Post Evaluation    Patient location during evaluation: PACU  Patient participation: complete - patient participated  Level of consciousness: awake and alert  Airway patency: patent  Nausea & Vomiting: no nausea  Cardiovascular status: hemodynamically stable  Respiratory status: acceptable and nasal cannula  Hydration status: euvolemic  Comments: Logan in PACU, but with pacer assistance becomes more hypotensive.   Pacer in backup at 30 bpm

## 2022-06-07 NOTE — Clinical Note
Sheath was removed in the right femoral artery. Site closed by Perclose.  16 Fr Hernandez sheath removed and perclosed

## 2022-06-07 NOTE — ANESTHESIA PROCEDURE NOTES
Arterial Line:    An arterial line was placed using ultrasound guidance, in the OR for the following indication(s): continuous blood pressure monitoring and blood sampling needed. A 20 gauge (size), (length), Arrow (type) catheter was placed, Seldinger technique used, into the left radial artery, secured by tape and Tegaderm. Anesthesia type: Local  Local infiltration: Injection    Events:  patient tolerated procedure well with no complications and EBL < 5mL. Additional notes:  Left arm prepped with ChloraPrep, 0.8ml of 1% lidocaine infiltrated at skin, ultrasound guided Seldinger technique, good blood return, good waveform. Potential access sites were examined with ultrasound and acceptable patent access site selected as noted above. Needle path and artery access visualized in real time using ultrasound, an image of wire in vessel recorded for permanent record.     6/7/2022 7:25 AM6/7/2022 7:30 AM  Resident/CRNA: JERAD Mckeon - CRNA  Performed: Resident/CRNA   Preanesthetic Checklist  Completed: patient identified, IV checked, risks and benefits discussed, surgical/procedural consents, equipment checked, pre-op evaluation, timeout performed, anesthesia consent given, oxygen available, monitors applied/VS acknowledged and blood product R/B/A discussed and consented

## 2022-06-08 ENCOUNTER — APPOINTMENT (OUTPATIENT)
Dept: NON INVASIVE DIAGNOSTICS | Age: 77
DRG: 267 | End: 2022-06-08
Attending: INTERNAL MEDICINE
Payer: MEDICARE

## 2022-06-08 VITALS
HEIGHT: 67 IN | RESPIRATION RATE: 20 BRPM | BODY MASS INDEX: 33.27 KG/M2 | WEIGHT: 212 LBS | TEMPERATURE: 98.1 F | SYSTOLIC BLOOD PRESSURE: 135 MMHG | HEART RATE: 75 BPM | OXYGEN SATURATION: 97 % | DIASTOLIC BLOOD PRESSURE: 64 MMHG

## 2022-06-08 PROBLEM — E78.00 PURE HYPERCHOLESTEROLEMIA: Status: ACTIVE | Noted: 2022-04-19

## 2022-06-08 PROBLEM — H91.93 BILATERAL DEAFNESS: Status: ACTIVE | Noted: 2022-04-19

## 2022-06-08 PROBLEM — Z95.2 S/P TAVR (TRANSCATHETER AORTIC VALVE REPLACEMENT): Chronic | Status: ACTIVE | Noted: 2022-06-07

## 2022-06-08 PROBLEM — I44.7 LBBB (LEFT BUNDLE BRANCH BLOCK): Status: ACTIVE | Noted: 2022-04-19

## 2022-06-08 PROBLEM — I25.10 CORONARY ARTERY DISEASE INVOLVING NATIVE CORONARY ARTERY OF NATIVE HEART WITHOUT ANGINA PECTORIS: Status: ACTIVE | Noted: 2022-05-18

## 2022-06-08 PROBLEM — I10 PRIMARY HYPERTENSION: Status: ACTIVE | Noted: 2022-04-19

## 2022-06-08 LAB
ANION GAP SERPL CALC-SCNC: 4 MMOL/L (ref 7–16)
BACTERIA SPEC CULT: NORMAL
BUN SERPL-MCNC: 16 MG/DL (ref 8–23)
CALCIUM SERPL-MCNC: 8.6 MG/DL (ref 8.3–10.4)
CHLORIDE SERPL-SCNC: 107 MMOL/L (ref 98–107)
CO2 SERPL-SCNC: 26 MMOL/L (ref 21–32)
CREAT SERPL-MCNC: 0.9 MG/DL (ref 0.8–1.5)
ECHO AO ASC DIAM: 2.7 CM
ECHO AO ASCENDING AORTA INDEX: 1.3 CM/M2
ECHO AV ACCELERATION TIME: 99 MS
ECHO AV AREA PEAK VELOCITY: 1.8 CM2
ECHO AV AREA VTI: 1.7 CM2
ECHO AV AREA/BSA PEAK VELOCITY: 0.9 CM2/M2
ECHO AV AREA/BSA VTI: 0.8 CM2/M2
ECHO AV MEAN GRADIENT: 9 MMHG
ECHO AV MEAN GRADIENT: 9 MMHG
ECHO AV MEAN VELOCITY: 1.4 M/S
ECHO AV PEAK GRADIENT: 21 MMHG
ECHO AV PEAK VELOCITY: 2.3 M/S
ECHO AV VELOCITY RATIO: 0.52
ECHO AV VTI: 41.7 CM
ECHO BSA: 2.13 M2
ECHO LA AREA 2C: 18.3 CM2
ECHO LA AREA 4C: 15.6 CM2
ECHO LA DIAMETER INDEX: 1.79 CM/M2
ECHO LA DIAMETER: 3.7 CM
ECHO LA MAJOR AXIS: 4.9 CM
ECHO LA MINOR AXIS: 5.2 CM
ECHO LA VOL BP: 47 ML (ref 18–58)
ECHO LA VOL/BSA BIPLANE: 23 ML/M2 (ref 16–34)
ECHO LV E' LATERAL VELOCITY: 8 CM/S
ECHO LV E' SEPTAL VELOCITY: 9 CM/S
ECHO LV EDV A4C: 142 ML
ECHO LV EDV INDEX A4C: 69 ML/M2
ECHO LV EJECTION FRACTION A4C: 54 %
ECHO LV ESV A4C: 65 ML
ECHO LV ESV INDEX A4C: 31 ML/M2
ECHO LV FRACTIONAL SHORTENING: 24 % (ref 28–44)
ECHO LV INTERNAL DIMENSION DIASTOLE INDEX: 2.42 CM/M2
ECHO LV INTERNAL DIMENSION DIASTOLIC: 5 CM (ref 4.2–5.9)
ECHO LV INTERNAL DIMENSION SYSTOLIC INDEX: 1.84 CM/M2
ECHO LV INTERNAL DIMENSION SYSTOLIC: 3.8 CM
ECHO LV IVSD: 1.3 CM (ref 0.6–1)
ECHO LV MASS 2D: 261.8 G (ref 88–224)
ECHO LV MASS INDEX 2D: 126.5 G/M2 (ref 49–115)
ECHO LV POSTERIOR WALL DIASTOLIC: 1.3 CM (ref 0.6–1)
ECHO LV RELATIVE WALL THICKNESS RATIO: 0.52
ECHO LVOT AREA: 3.5 CM2
ECHO LVOT AV VTI INDEX: 0.5
ECHO LVOT DIAM: 2.1 CM
ECHO LVOT MEAN GRADIENT: 2 MMHG
ECHO LVOT PEAK GRADIENT: 5 MMHG
ECHO LVOT PEAK VELOCITY: 1.2 M/S
ECHO LVOT STROKE VOLUME INDEX: 35.1 ML/M2
ECHO LVOT SV: 72.7 ML
ECHO LVOT VTI: 21 CM
ECHO MV A VELOCITY: 1.04 M/S
ECHO MV AREA VTI: 3.2 CM2
ECHO MV E DECELERATION TIME (DT): 144 MS
ECHO MV E VELOCITY: 0.67 M/S
ECHO MV E/A RATIO: 0.64
ECHO MV E/E' LATERAL: 8.38
ECHO MV E/E' RATIO (AVERAGED): 7.91
ECHO MV E/E' SEPTAL: 7.44
ECHO MV LVOT VTI INDEX: 1.09
ECHO MV MAX VELOCITY: 1.3 M/S
ECHO MV MEAN GRADIENT: 2 MMHG
ECHO MV MEAN VELOCITY: 0.7 M/S
ECHO MV PEAK GRADIENT: 7 MMHG
ECHO MV VTI: 22.9 CM
ECHO RV BASAL DIMENSION: 3.2 CM
ECHO RV INTERNAL DIMENSION: 2.5 CM
ECHO RV TAPSE: 2.5 CM (ref 1.7–?)
ECHO TV REGURGITANT MAX VELOCITY: 2.21 M/S
ECHO TV REGURGITANT PEAK GRADIENT: 20 MMHG
EKG ATRIAL RATE: 68 BPM
EKG DIAGNOSIS: NORMAL
EKG P AXIS: 85 DEGREES
EKG P-R INTERVAL: 210 MS
EKG Q-T INTERVAL: 428 MS
EKG QRS DURATION: 150 MS
EKG QTC CALCULATION (BAZETT): 455 MS
EKG R AXIS: -6 DEGREES
EKG T AXIS: 179 DEGREES
EKG VENTRICULAR RATE: 68 BPM
ERYTHROCYTE [DISTWIDTH] IN BLOOD BY AUTOMATED COUNT: 12.8 % (ref 11.9–14.6)
GLUCOSE SERPL-MCNC: 109 MG/DL (ref 65–100)
HCT VFR BLD AUTO: 37 % (ref 41.1–50.3)
HGB BLD-MCNC: 12.5 G/DL (ref 13.6–17.2)
MAGNESIUM SERPL-MCNC: 2.1 MG/DL (ref 1.8–2.4)
MCH RBC QN AUTO: 32.6 PG (ref 26.1–32.9)
MCHC RBC AUTO-ENTMCNC: 33.8 G/DL (ref 31.4–35)
MCV RBC AUTO: 96.4 FL (ref 79.6–97.8)
NRBC # BLD: 0 K/UL (ref 0–0.2)
PLATELET # BLD AUTO: 252 K/UL (ref 150–450)
PMV BLD AUTO: 9 FL (ref 9.4–12.3)
POTASSIUM SERPL-SCNC: 3.5 MMOL/L (ref 3.5–5.1)
RBC # BLD AUTO: 3.84 M/UL (ref 4.23–5.6)
SERVICE CMNT-IMP: NORMAL
SODIUM SERPL-SCNC: 137 MMOL/L (ref 138–145)
WBC # BLD AUTO: 8.9 K/UL (ref 4.3–11.1)

## 2022-06-08 PROCEDURE — 85027 COMPLETE CBC AUTOMATED: CPT

## 2022-06-08 PROCEDURE — C8929 TTE W OR WO FOL WCON,DOPPLER: HCPCS

## 2022-06-08 PROCEDURE — 83735 ASSAY OF MAGNESIUM: CPT

## 2022-06-08 PROCEDURE — 6370000000 HC RX 637 (ALT 250 FOR IP): Performed by: INTERNAL MEDICINE

## 2022-06-08 PROCEDURE — 80048 BASIC METABOLIC PNL TOTAL CA: CPT

## 2022-06-08 PROCEDURE — 93005 ELECTROCARDIOGRAM TRACING: CPT | Performed by: INTERNAL MEDICINE

## 2022-06-08 PROCEDURE — 93306 TTE W/DOPPLER COMPLETE: CPT | Performed by: INTERNAL MEDICINE

## 2022-06-08 PROCEDURE — 6360000004 HC RX CONTRAST MEDICATION: Performed by: INTERNAL MEDICINE

## 2022-06-08 PROCEDURE — 6370000000 HC RX 637 (ALT 250 FOR IP): Performed by: PHYSICIAN ASSISTANT

## 2022-06-08 PROCEDURE — 36415 COLL VENOUS BLD VENIPUNCTURE: CPT

## 2022-06-08 PROCEDURE — 99024 POSTOP FOLLOW-UP VISIT: CPT | Performed by: INTERNAL MEDICINE

## 2022-06-08 RX ADMIN — PERFLUTREN 2 ML: 6.52 INJECTION, SUSPENSION INTRAVENOUS at 09:25

## 2022-06-08 NOTE — PLAN OF CARE
Problem: Safety - Adult  Goal: Free from fall injury  6/8/2022 0128 by Mi Robin RN  Outcome: Progressing  6/7/2022 1759 by Elin Sewell RN  Outcome: Progressing     Problem: Pain  Goal: Verbalizes/displays adequate comfort level or baseline comfort level  6/8/2022 0128 by Mi Robin RN  Outcome: Progressing  Flowsheets (Taken 6/7/2022 1925)  Verbalizes/displays adequate comfort level or baseline comfort level:   Encourage patient to monitor pain and request assistance   Administer analgesics based on type and severity of pain and evaluate response  6/7/2022 1759 by Elin Sewell RN  Outcome: Progressing     Problem: Discharge Planning  Goal: Discharge to home or other facility with appropriate resources  6/8/2022 0128 by Mi Robin RN  Outcome: Progressing  Flowsheets (Taken 6/7/2022 1925)  Discharge to home or other facility with appropriate resources: Identify barriers to discharge with patient and caregiver  6/7/2022 1759 by Elin Sewell RN  Outcome: Progressing     Problem: ABCDS Injury Assessment  Goal: Absence of physical injury  Outcome: Progressing

## 2022-06-08 NOTE — PLAN OF CARE
Problem: Safety - Adult  Goal: Free from fall injury  6/8/2022 1444 by Morenita Maldonado RN  Outcome: Completed  6/8/2022 0128 by Yola Collins RN  Outcome: Progressing     Problem: Pain  Goal: Verbalizes/displays adequate comfort level or baseline comfort level  6/8/2022 1444 by Morenita Maldonado, RN  Outcome: Completed  6/8/2022 0128 by Yola Collins RN  Outcome: Progressing  Flowsheets (Taken 6/7/2022 1925)  Verbalizes/displays adequate comfort level or baseline comfort level:   Encourage patient to monitor pain and request assistance   Administer analgesics based on type and severity of pain and evaluate response     Problem: Discharge Planning  Goal: Discharge to home or other facility with appropriate resources  6/8/2022 1444 by Morenita Maldonado RN  Outcome: Completed  6/8/2022 0128 by Yola Collins RN  Outcome: Progressing  Flowsheets (Taken 6/7/2022 1925)  Discharge to home or other facility with appropriate resources: Identify barriers to discharge with patient and caregiver     Problem: ABCDS Injury Assessment  Goal: Absence of physical injury  6/8/2022 1444 by Morenita Maldonado RN  Outcome: Completed  6/8/2022 0128 by Yola Collins RN  Outcome: Progressing

## 2022-06-08 NOTE — CARE COORDINATION
Pt with discharge orders this day. Chart screened by  for discharge planning. No CM needs identified at time of discharge. Milestones met.         06/07/22 2034   Service Assessment   Support Systems Spouse/Significant Other   Discharge Planning   Type of Residence House   Living Arrangements Spouse/Significant Other   Current Services Prior To Admission None   Potential Assistance Needed N/A   Type of Home Care Services None   Patient expects to be discharged to: House   Condition of Participation: Discharge Planning   The Plan for Transition of Care is related to the following treatment goals: Home

## 2022-06-08 NOTE — DISCHARGE SUMMARY
[facility]  Cardiology Discharge Summary     Patient ID:  Moe Alex  [id number]  4 y.o.  1945    Admit date: 6/7/2022    Discharge date: 06/08/22     Admitting Physician: Francesca Navarro MD     Discharge Physician: JERAD Lyles - DEB/Dr. Tor Palomo    Admission Diagnoses: Aortic valve stenosis, etiology of cardiac valve disease unspecified [I35.0]  Aortic stenosis, severe [I35.0]    Discharge Diagnoses:   Patient Active Problem List    Diagnosis Date Noted    S/P TAVR (transcatheter aortic valve replacement) 06/07/2022    Coronary artery disease involving native coronary artery of native heart without angina pectoris 05/18/2022    Aortic valve stenosis 05/18/2022    LBBB (left bundle branch block) 04/19/2022    Primary hypertension 04/19/2022    Pure hypercholesterolemia 04/19/2022         Cardiology Procedures this admission:  Transcatheter aortic valve replacement and echocardiogram     Consults: none    Hospital Course: Patient was seen at the office of [facility] by Dr. Mei Fraser in follow up for severe aortic stenosis. The patient was felt to be an appropriate candidate for TAVR. He was admitted for planned TAVR. The patient underwent successful balloon valvuloplasty and transcatheter aortic valve replacement with a 29 mm Hernandez Kwasi 3 valve. The patient was monitored closely in the [facility]. The afternoon of 6/8/2022,  the patient was up feeling well without any complaints of chest pain or shortness of breath. Patient's labs were stable. Patient's bilateral femoral sites without any bleeding, bruising or hematoma. Post TAVR echocardiogram showed   Left Ventricle: Low normal left ventricular systolic function with a visually estimated EF of 50 - 55%. Left ventricle size is normal. Moderately increased wall thickness. Paradoxical septal wall motion due to underlying left bundle branch block. Abnormal diastolic function.     Aortic Valve: 29 mm Hernandez MANAS ultra valve No regurgitation. No stenosis. AV peak gradient is 21 mmHg. AV Velocity Ratio is 0.52.   Left Atrium: Left atrium is mildly dilated.   Right Atrium: Right atrium is moderately dilated.   Technical qualifiers: Technically difficult study and color flow Doppler was performed.   Contrast used: Definity.     Patient was seen and examined by Dr. Kevin Grullon and determined stable and ready for discharge. Patient was instructed on the importance of medication compliance including dual anti-platelet therapy (ASA/Brilinta) for at least 6 months or longer with extensive recent PCI. The patient can resume lisinopril/HCTZ tomorrow. The patient will have transitional care follow up with [facility] Cardiology Dr. Claudetta Nay on 6/23/2022 at 1230 pm in [facility] office . A repeat echocardiogram s/p TAVR is scheduled for 7/21/2022 at 1130 am in [facility] office and CBC/BMP will be done prior to that appt. The patient will see Dr. Claudetta Nay for 30 day follow up appt on 7/21/2022 at 1:30 pm in [facility] office. The patient was referred to cardiac rehab as well. DISPOSITION: The patient is being discharged home in stable condition on a low saturated fat, low cholesterol and low salt diet. The patient is instructed to advance activities as tolerated to the limit of fatigue or shortness of breath. The patient is instructed to avoid lifting anything heavier than 10 lbs for 2 weeks. The patient is instructed to avoid any straining, stooping or squatting for 2 weeks. The patient is instructed not to drive for 1 week. The patient is instructed to watch the groin site for bleeding/oozing; if seen, the patient is instructed to apply firm pressure with a clean cloth and call [facility] Cardiology at [phone]. The patient is instructed to watch for signs of infection which include: increasing area of redness, fever/hot to touch or purulent drainage at the groin site.  The patient is instructed not to soak in a bathtub for 7-10 days, but is cleared to shower. The patient is instructed to return to the ER immediately for any severe pain, color change, or temperature change in leg. The patient was informed that prophylaxis for bacterial endocarditis is recommended for high-risk procedures such as all dental procedures that involve manipulation of gingival tissue, the periapical region of teeth, or perforation of the oral mucosa. Discharge Exam: /64   Pulse 75   Temp 98.1 °F (36.7 °C) (Axillary)   Resp 20   Ht 5' 7\" (1.702 m)   Wt 212 lb (96.2 kg)   SpO2 97%   BMI 33.20 kg/m²       Patient has been seen by Dr. Ashleigh Santoyo: see his progress note for exam details.     Recent Results (from the past 24 hour(s))   Basic Metabolic Panel w/ Reflex to MG    Collection Time: 06/08/22  7:14 AM   Result Value Ref Range    Sodium 137 (L) 138 - 145 mmol/L    Potassium 3.5 3.5 - 5.1 mmol/L    Chloride 107 98 - 107 mmol/L    CO2 26 21 - 32 mmol/L    Anion Gap 4 (L) 7 - 16 mmol/L    Glucose 109 (H) 65 - 100 mg/dL    BUN 16 8 - 23 MG/DL    CREATININE 0.90 0.8 - 1.5 MG/DL    GFR African American >60 >60 ml/min/1.73m2    GFR Non- >60 >60 ml/min/1.73m2    Calcium 8.6 8.3 - 10.4 MG/DL   CBC    Collection Time: 06/08/22  7:14 AM   Result Value Ref Range    WBC 8.9 4.3 - 11.1 K/uL    RBC 3.84 (L) 4.23 - 5.6 M/uL    Hemoglobin 12.5 (L) 13.6 - 17.2 g/dL    Hematocrit 37.0 (L) 41.1 - 50.3 %    MCV 96.4 79.6 - 97.8 FL    MCH 32.6 26.1 - 32.9 PG    MCHC 33.8 31.4 - 35.0 g/dL    RDW 12.8 11.9 - 14.6 %    Platelets 061 230 - 419 K/uL    MPV 9.0 (L) 9.4 - 12.3 FL    nRBC 0.00 0.0 - 0.2 K/uL   Magnesium    Collection Time: 06/08/22  7:14 AM   Result Value Ref Range    Magnesium 2.1 1.8 - 2.4 mg/dL   Transthoracic echocardiogram (TTE) complete with contrast, bubble, strain, and 3D PRN    Collection Time: 06/08/22  9:30 AM   Result Value Ref Range    LA Minor Axis 5.2 cm    LA Major [place] 4.9 cm    LA Area 2C 18.3 cm2    LA Area 4C 15.6 cm2    LA Volume BP 47 18 - 58 mL    LA Diameter 3.7 cm    LV EDV A4C 142 mL    LV ESV A4C 65 mL    IVSd 1.3 (A) 0.6 - 1.0 cm    LVIDd 5.0 4.2 - 5.9 cm    LVIDs 3.8 cm    LVOT Diameter 2.1 cm    LVOT Mean Gradient 2 mmHg    LVOT VTI 21.0 cm    LVOT Peak Velocity 1.2 m/s    LVOT Peak Gradient 5 mmHg    LVPWd 1.3 (A) 0.6 - 1.0 cm    LV E' Lateral Velocity 8 cm/s    LV E' Septal Velocity 9 cm/s    LV Ejection Fraction A4C 54 %    LVOT Area 3.5 cm2    LVOT SV 72.7 ml    AV AT 99.00 ms    AV Mean Velocity 1.4 m/s    AV Mean Gradient 9 mmHg    AV Mean Gradient 9 mmHg    AV VTI 41.7 cm    AV Peak Velocity 2.3 m/s    AV Peak Gradient 21 mmHg    AV Area by VTI 1.7 cm2    AV Area by Peak Velocity 1.8 cm2    Ascending Aorta 2.7 cm    MV E Wave Deceleration Time 144.0 ms    MV A Velocity 1.04 m/s    MV E Velocity 0.67 m/s    MV Mean Gradient 2 mmHg    MV VTI 22.9 cm    MV Mean Velocity 0.7 m/s    MV Max Velocity 1.3 m/s    MV Peak Gradient 7 mmHg    MV Area by VTI 3.2 cm2    RVIDd 2.5 cm    RV Basal Dimension 3.2 cm    TAPSE 2.5 1.7 cm    TR Max Velocity 2.21 m/s    TR Peak Gradient 20 mmHg    Body Surface Area 2.13 m2    Fractional Shortening 2D 24 28 - 44 %    LV ESV Index A4C 31 mL/m2    LV EDV Index A4C 69 mL/m2    LVIDd Index 2.42 cm/m2    LVIDs Index 1.84 cm/m2    LV RWT Ratio 0.52     LV Mass 2D 261.8 (A) 88 - 224 g    LV Mass 2D Index 126.5 (A) 49 - 115 g/m2    MV E/A 0.64     E/E' Ratio (Averaged) 7.91     E/E' Lateral 8.38     E/E' Septal 7.44     LA Volume Index BP 23 16 - 34 ml/m2    LVOT Stroke Volume Index 35.1 mL/m2    LA Size Index 1.79 cm/m2    Ascending Aorta Index 1.30 cm/m2    AV Velocity Ratio 0.52     LVOT:AV VTI Index 0.50     MARILYN/BSA VTI 0.8 cm2/m2    MARILYN/BSA Peak Velocity 0.9 cm2/m2    MV:LVOT VTI Index 1.09          Patient Instructions:     Current Discharge Medication List      CONTINUE these medications which have NOT CHANGED    Details   mupirocin (BACTROBAN) 2 % ointment Apply topically daily Apply topically 3 times daily.       pitavastatin (LIVALO) 4 MG TABS tablet Take 4 mg by mouth daily      lisinopril-hydroCHLOROthiazide (PRINZIDE;ZESTORETIC) 20-25 MG per tablet Take 1 tablet by mouth daily      aspirin 81 MG EC tablet Take 81 mg by mouth daily      Multiple Vitamin (MULTIVITAMIN PO) Take by mouth daily      cetirizine (ZYRTEC) 10 MG tablet Take 10 mg by mouth daily      VITAMIN E PO Take by mouth      VITAMIN D PO Take by mouth      Omega-3 Fatty Acids (FISH OIL PO) Take by mouth daily      ticagrelor (BRILINTA) 90 MG TABS tablet Take 90 mg by mouth 2 times daily      Cyanocobalamin (VITAMIN B-12 PO) Take by mouth daily             Signed:  JERAD Jacobson - CNP-C  6/8/2022  12:27 PM

## 2022-06-08 NOTE — PROGRESS NOTES
[facility] CARDIOLOGY PROGRESS NOTE           6/8/2022 7:50 AM    Admit Date: 6/7/2022      Subjective:   Patient is resting comfortably in room. Bilateral groin access sites appear to be healing well. Hemodynamics are stable. ROS:  Cardiovascular:  As noted above    Objective:      Vitals:    06/07/22 2310 06/08/22 0349 06/08/22 0630 06/08/22 0707   BP: 132/67 138/65  (!) 145/75   Pulse: 75   83   Resp: 17 17  12   Temp: 98.1 °F (36.7 °C) 98 °F (36.7 °C)  97.7 °F (36.5 °C)   TempSrc: Oral Oral  Oral   SpO2: 96% 97%  95%   Weight:   212 lb 12.8 oz (96.5 kg)    Height:           Physical Exam:  General-No Acute Distress  Neck- supple, no JVD  CV- regular rate and rhythm no MRG  Lung- clear bilaterally  Abd- soft, nontender, nondistended  Ext- no edema bilaterally. Skin- warm and dry    Data Review:   Recent Labs     06/06/22  0931      K 3.7   MG 2.4   BUN 16   WBC 5.8   HGB 13.7   HCT 40.3*      INR 1.0       Assessment/Plan:     Principal Problem:    S/P TAVR (transcatheter aortic valve replacement)  Plan: Patient is stable status post TAVR. He is on aspirin and Brilinta. Echocardiogram is pending. Hemodynamics have been stable. Labs are pending this morning. Active Problems:    LBBB (left bundle branch block)  Plan: Chronic and stable    Primary hypertension  Plan: Patient is on lisinopril/HCTZ at home. He will take lisinopril this morning we will avoid HCTZ given recent contrast exposure. Patient can resume lisinopril HCTZ tomorrow. Pure hypercholesterolemia  Plan: Patient is on Livalo at home. He may take home Livalo. Bilateral deafness  Plan: Chronic and stable    Coronary artery disease involving native coronary artery of native heart without angina pectoris  Plan: Patient with recent extensive PCI of right coronary artery. He denies angina. Stable on aspirin and Brilinta.         Jerzy Gerardo MD  6/8/2022 7:50 AM

## 2022-06-08 NOTE — PROGRESS NOTES
Cardiac Rehab: Spoke with patient regarding referral to cardiac rehab. Patient meets admission criteria based on TAVR (6/7/22). Written information about Cardiac Rehab given and reviewed with patient. Discussed lifestyle modifications to promote cardiac wellness. Patient indicated that he wants to participate in the cardiac rehab program at the [address] [place] [address] which is closest to his home in [place], [place]. His Cardiologist is Dr. Loyda Henning.       Thank you,  JANICE AdrianN, RN  Cardiopulmonary Rehabilitation Nurse Liaison  [facility]

## 2022-06-09 LAB
ABO + RH BLD: NORMAL
BLD PROD TYP BPU: NORMAL
BLOOD BANK DISPENSE STATUS: NORMAL
BLOOD GROUP ANTIBODIES SERPL: NORMAL
BPU ID: NORMAL
CROSSMATCH RESULT: NORMAL
SPECIMEN EXP DATE BLD: NORMAL
UNIT DIVISION: 0

## 2022-06-13 ENCOUNTER — TELEPHONE (OUTPATIENT)
Dept: CASE MANAGEMENT | Age: 77
End: 2022-06-13

## 2022-06-13 NOTE — TELEPHONE ENCOUNTER
Called pt spouse regarding TAVR on 6/7 and DC home. Groin sites ecchymotic but no pain currently, had some yesterday, to the sites. Continues on ASA and Brilinta. Aware of upcoming appts and labs needed prior to July appt.      Gregory Goldsmith, Structural Heart NAvigator

## 2022-06-16 ENCOUNTER — NURSE ONLY (OUTPATIENT)
Dept: CARDIOLOGY CLINIC | Age: 77
End: 2022-06-16

## 2022-06-16 ENCOUNTER — TELEPHONE (OUTPATIENT)
Dept: CARDIOLOGY CLINIC | Age: 77
End: 2022-06-16

## 2022-06-16 NOTE — TELEPHONE ENCOUNTER
Transitional Care fu after DC from [facility] 6/8/22 S/P TAVR. I spoke w/pt. she said his left leg near inner thigh is having pain and can feel a knot the size of a pea. This pain started about 3-4 days ago. It is on the leg itself not where the catheter was inserted. The leg is so bruised that you can not tell if it is red. It hurts just when seated. The area is not hot to the touch or swollen but he feels like it is burning on the inside of his skin. Is this advise.

## 2022-06-16 NOTE — TELEPHONE ENCOUNTER
APPT.MADE TODAY FOR LEG CHECK. He has all his meds. I went over dc summary including diet,activity,post TAVR care,importance of med compliance,dental prophylaxis and fu appt. 6/23 w//ASHLEY. All questions answered and pt.to come by this afternoon for nurse visit.

## 2022-06-16 NOTE — PROGRESS NOTES
Pt in today for nurse visit. Left upper thigh up to groin area. He has a burning sensation inside the leg when he sits down. No heat, no pain. Bruising going up the left inner thigh to the groin area. Transcatheter aortic valve replacement done 6/7/22. Pt states he still has a small lump at insertion site. Dr Tiffanie Celaya saw patient and stated site was fine, just normal bruising.

## 2022-06-23 ENCOUNTER — OFFICE VISIT (OUTPATIENT)
Dept: CARDIOLOGY CLINIC | Age: 77
End: 2022-06-23
Payer: MEDICARE

## 2022-06-23 VITALS
WEIGHT: 213 LBS | SYSTOLIC BLOOD PRESSURE: 125 MMHG | HEART RATE: 76 BPM | DIASTOLIC BLOOD PRESSURE: 76 MMHG | BODY MASS INDEX: 33.43 KG/M2 | HEIGHT: 67 IN

## 2022-06-23 DIAGNOSIS — I25.10 CORONARY ARTERY DISEASE INVOLVING NATIVE CORONARY ARTERY OF NATIVE HEART WITHOUT ANGINA PECTORIS: ICD-10-CM

## 2022-06-23 DIAGNOSIS — Z95.2 S/P TAVR (TRANSCATHETER AORTIC VALVE REPLACEMENT): Primary | Chronic | ICD-10-CM

## 2022-06-23 DIAGNOSIS — I44.7 LBBB (LEFT BUNDLE BRANCH BLOCK): ICD-10-CM

## 2022-06-23 DIAGNOSIS — I10 PRIMARY HYPERTENSION: ICD-10-CM

## 2022-06-23 PROCEDURE — 99214 OFFICE O/P EST MOD 30 MIN: CPT | Performed by: INTERNAL MEDICINE

## 2022-06-23 PROCEDURE — G8427 DOCREV CUR MEDS BY ELIG CLIN: HCPCS | Performed by: INTERNAL MEDICINE

## 2022-06-23 PROCEDURE — 3017F COLORECTAL CA SCREEN DOC REV: CPT | Performed by: INTERNAL MEDICINE

## 2022-06-23 PROCEDURE — G8417 CALC BMI ABV UP PARAM F/U: HCPCS | Performed by: INTERNAL MEDICINE

## 2022-06-23 PROCEDURE — 1123F ACP DISCUSS/DSCN MKR DOCD: CPT | Performed by: INTERNAL MEDICINE

## 2022-06-23 PROCEDURE — 1036F TOBACCO NON-USER: CPT | Performed by: INTERNAL MEDICINE

## 2022-06-23 PROCEDURE — 1111F DSCHRG MED/CURRENT MED MERGE: CPT | Performed by: INTERNAL MEDICINE

## 2022-06-23 RX ORDER — LISINOPRIL AND HYDROCHLOROTHIAZIDE 25; 20 MG/1; MG/1
1 TABLET ORAL DAILY
Qty: 30 TABLET | Refills: 5 | Status: SHIPPED | OUTPATIENT
Start: 2022-06-23 | End: 2022-07-21 | Stop reason: SDUPTHER

## 2022-06-23 RX ORDER — PITAVASTATIN CALCIUM 4.18 MG/1
4 TABLET, FILM COATED ORAL DAILY
Qty: 90 TABLET | Refills: 3 | Status: SHIPPED | OUTPATIENT
Start: 2022-06-23 | End: 2022-07-21 | Stop reason: SDUPTHER

## 2022-06-23 ASSESSMENT — ENCOUNTER SYMPTOMS: SHORTNESS OF BREATH: 0

## 2022-06-23 NOTE — PROGRESS NOTES
[address] [place], [place]  [address], [address]  PHONE:  [address]  4[birth year]57      SUBJECTIVE:   Julianne Apley is a 78 y.o. male seen for a follow up visit regarding the following:     Chief Complaint   Patient presents with    Procedure     TAVR FU 6-7-22    Coronary Artery Disease       HPI:    Patient presents for transitional care management after TAVR procedure. This was performed on June 7, 2022. TAVR (6/7/22):  29 mm Hernandez Kwasi S3 valve. His echo the following day showed EF 50-55%. Normally functioning valve. Dimensionless index 0.52. Did well following the procedure without any complications. He does have a baseline left bundle branch block but had no progression of his conduction system disease. He denies any dyspnea on exertion, chest pain or syncope since his hospitalization. He has some ecchymosis on his left femoral site which is clearing. Notes excessive bruising with Brilinta. Had previous PCI prior to TAVR        Past Medical History, Past Surgical History, Family history, Social History, and Medications were all reviewed with the patient today and updated as necessary.            Current Outpatient Medications:     ticagrelor (BRILINTA) 90 MG TABS tablet, Take 1 tablet by mouth 2 times daily, Disp: 180 tablet, Rfl: 3    lisinopril-hydroCHLOROthiazide (PRINZIDE;ZESTORETIC) 20-25 MG per tablet, Take 1 tablet by mouth daily, Disp: 30 tablet, Rfl: 5    pitavastatin (LIVALO) 4 MG TABS tablet, Take 1 tablet by mouth daily, Disp: 90 tablet, Rfl: 3    aspirin 81 MG EC tablet, Take 81 mg by mouth daily, Disp: , Rfl:     Multiple Vitamin (MULTIVITAMIN PO), Take by mouth daily, Disp: , Rfl:     cetirizine (ZYRTEC) 10 MG tablet, Take 10 mg by mouth daily, Disp: , Rfl:     VITAMIN E PO, Take by mouth, Disp: , Rfl:     VITAMIN D PO, Take by mouth, Disp: , Rfl:     Omega-3 Fatty Acids (FISH OIL PO), Take by mouth daily, Disp: , Rfl:     Cyanocobalamin (VITAMIN B-12 PO), Take by mouth daily, Disp: , Rfl:     mupirocin (BACTROBAN) 2 % ointment, Apply topically daily Apply topically 3 times daily. (Patient not taking: Reported on 6/23/2022), Disp: , Rfl:      Allergies   Allergen Reactions    Epinephrine      \"nervousness\"        Patient Active Problem List    Diagnosis Date Noted    S/P TAVR (transcatheter aortic valve replacement) 06/07/2022     Priority: Medium    Coronary artery disease involving native coronary artery of native heart without angina pectoris 05/18/2022     Priority: Low    Aortic valve stenosis 05/18/2022     Priority: Low    LBBB (left bundle branch block) 04/19/2022     Priority: Low    Primary hypertension 04/19/2022     Priority: Low    Pure hypercholesterolemia 04/19/2022     Priority: Low    Family history of cardiovascular disease 04/19/2022     Priority: Low    Bilateral deafness 04/19/2022     Priority: Low    Murmur 04/19/2022     Priority: Low    Chest discomfort 04/19/2022     Priority: Low     Added automatically from request for surgery [id number]            Social History     Tobacco Use    Smoking status: Never Smoker    Smokeless tobacco: Never Used   Substance Use Topics    Alcohol use: Yes     Comment: socially       ROS:    Review of Systems   Constitutional: Negative for malaise/fatigue. Cardiovascular: Negative for chest pain. Respiratory: Negative for shortness of breath. Hematologic/Lymphatic: Bruises/bleeds easily. Musculoskeletal: Positive for arthritis. Neurological: Negative for focal weakness. Psychiatric/Behavioral: Negative for depression.            PHYSICAL EXAM:  Wt Readings from Last 3 Encounters:   06/23/22 213 lb (96.6 kg)   06/08/22 212 lb (96.2 kg)   06/06/22 213 lb 3.5 oz (96.7 kg)     BP Readings from Last 3 Encounters:   06/23/22 125/76   06/08/22 135/64   06/06/22 118/72     Pulse Readings from Last 3 Encounters:   06/23/22 76   06/08/22 75 06/06/22 56       Physical Exam  Constitutional:       General: He is not in acute distress. Neck:      Vascular: No carotid bruit. Cardiovascular:      Rate and Rhythm: Normal rate and regular rhythm. Heart sounds: Murmur (Grade I/VI RENÉE) heard. Pulmonary:      Effort: No respiratory distress. Breath sounds: Normal breath sounds. Abdominal:      General: Bowel sounds are normal.      Palpations: Abdomen is soft. Musculoskeletal:         General: No swelling. Skin:     General: Skin is warm and dry. Neurological:      General: No focal deficit present. Psychiatric:         Mood and Affect: Mood normal.         Medical problems and test results were reviewed with the patient today. Lab Results   Component Value Date    WBC 8.9 06/08/2022    HGB 12.5 (L) 06/08/2022    HCT 37.0 (L) 06/08/2022    MCV 96.4 06/08/2022     06/08/2022       Lab Results   Component Value Date     06/08/2022    K 3.5 06/08/2022     06/08/2022    CO2 26 06/08/2022    BUN 16 06/08/2022    CREATININE 0.90 06/08/2022    GLUCOSE 109 06/08/2022    CALCIUM 8.6 06/08/2022        No results found for: CHOL  No results found for: TRIG  No results found for: HDL  No results found for: LDLCHOLESTEROL, LDLCALC  No results found for: LABVLDL, VLDL  No results found for: CHOLHDLRATIO     Data from outside records/labs from outside providers have been reviewed and summarized as noted in the HPI, past history and data review sections of this note       ASSESSMENT and PLAN      1. S/P TAVR (transcatheter aortic valve replacement)  Patient has undergone successful Transcatheter Aortic Valve Replacement. Postoperative echocardiogram confirms normally functioning bioprosthetic valve. We discussed plan of care incuding repeat echo in next 4-6 weeks with follow up at this time.   Patient is encouraged to contact my office immediately with any symptoms of dizziness, lightheadedness or syncope as this could represent AV block which can occur after aortic valve replacement. Patient also educated to contact office with any worsening dyspnea, edema, chest pain or erythema/redness/bleeding at arteriotomy sites. We discussed need for endocarditis prophylaxsis with dental procedures. Will continue current antiplatelet regimen. .    2. LBBB (left bundle branch block)  No changes. Call with dizziness or syncope. 3. Coronary artery disease involving native coronary artery of native heart without angina pectoris  No angina. Needs to continue aspirin therapy indefinitely and Brilinta for 6 months post PCI    4. Primary hypertension  Blood pressure currently controlled. Continue lisinopril/HCTZ                   Paxton Bhakta MD  6/23/2022  12:08 PM    This note may have been dictated using speech recognition software.   As a result, error of speech recognition may have occurred

## 2022-07-15 DIAGNOSIS — Z95.2 S/P TAVR (TRANSCATHETER AORTIC VALVE REPLACEMENT): ICD-10-CM

## 2022-07-21 ENCOUNTER — OFFICE VISIT (OUTPATIENT)
Dept: CARDIOLOGY CLINIC | Age: 77
End: 2022-07-21
Payer: MEDICARE

## 2022-07-21 VITALS
HEIGHT: 67 IN | HEART RATE: 52 BPM | SYSTOLIC BLOOD PRESSURE: 122 MMHG | WEIGHT: 209 LBS | BODY MASS INDEX: 32.8 KG/M2 | DIASTOLIC BLOOD PRESSURE: 74 MMHG

## 2022-07-21 DIAGNOSIS — Z95.2 S/P TAVR (TRANSCATHETER AORTIC VALVE REPLACEMENT): Chronic | ICD-10-CM

## 2022-07-21 DIAGNOSIS — H91.93 BILATERAL DEAFNESS: ICD-10-CM

## 2022-07-21 DIAGNOSIS — I25.10 CORONARY ARTERY DISEASE INVOLVING NATIVE CORONARY ARTERY OF NATIVE HEART WITHOUT ANGINA PECTORIS: Primary | ICD-10-CM

## 2022-07-21 DIAGNOSIS — I44.7 LBBB (LEFT BUNDLE BRANCH BLOCK): ICD-10-CM

## 2022-07-21 PROCEDURE — 1123F ACP DISCUSS/DSCN MKR DOCD: CPT | Performed by: INTERNAL MEDICINE

## 2022-07-21 PROCEDURE — G8427 DOCREV CUR MEDS BY ELIG CLIN: HCPCS | Performed by: INTERNAL MEDICINE

## 2022-07-21 PROCEDURE — 3017F COLORECTAL CA SCREEN DOC REV: CPT | Performed by: INTERNAL MEDICINE

## 2022-07-21 PROCEDURE — 1036F TOBACCO NON-USER: CPT | Performed by: INTERNAL MEDICINE

## 2022-07-21 PROCEDURE — G8417 CALC BMI ABV UP PARAM F/U: HCPCS | Performed by: INTERNAL MEDICINE

## 2022-07-21 PROCEDURE — 99214 OFFICE O/P EST MOD 30 MIN: CPT | Performed by: INTERNAL MEDICINE

## 2022-07-21 RX ORDER — LISINOPRIL AND HYDROCHLOROTHIAZIDE 25; 20 MG/1; MG/1
1 TABLET ORAL DAILY
Qty: 30 TABLET | Refills: 11 | Status: SHIPPED | OUTPATIENT
Start: 2022-07-21

## 2022-07-21 RX ORDER — PITAVASTATIN CALCIUM 4.18 MG/1
4 TABLET, FILM COATED ORAL DAILY
Qty: 30 TABLET | Refills: 11 | Status: SHIPPED | OUTPATIENT
Start: 2022-07-21

## 2022-07-21 ASSESSMENT — KANSAS CITY CARDIOMYOPATHY QUESTIONNAIRE (KCCQ12)
3. OVER THE PAST 2 WEEKS, ON AVERAGE, HOW MANY TIMES HAS FATIGUE LIMITED YOUR ABILITY TO DO WHAT YOU WANTED: 7
1C. OVER THE PAST 2 WEEKS, HOW MUCH WERE YOU LIMITED BY HEART FAILURE SYMPTOMS (SHORTNESS OF BREATH OR FATIGUE) WHEN HURRYING OR JOGGING (AS IF TO CATCH A BUS): 5
8B. OVER THE PAST 2 WEEKS, ON AVERAGE, HOW HAS HEART FAILURE LIMITED YOU ABILITY TO WORK OR DO HOUSEHOLD CHORES: 5
2. OVER THE PAST 2 WEEKS, HOW MANY TIMES DID YOU HAVE SWELLING IN YOUR FEET, ANKLES OR LEGS WHEN YOU WOKE UP IN THE MORNING: 5
8C. OVER THE PAST 2 WEEKS, ON AVERAGE, HOW HAS HEART FAILURE LIMITED YOU ABILITY TO VISIT FAMILY AND FRIENDS OUR OF YOUR HOME: 5
1A. OVER THE PAST 2 WEEKS, HOW MUCH WERE YOU LIMITED BY HEART FAILURE SYMPTOMS (SHORTNESS OF BREATH OR FATIGUE) WHEN SHOWERING OR BATHING: 5
4. OVER THE PAST 2 WEEKS, ON AVERAGE, HOW MANY TIMES HAS SHORTNESS OF BREATH LIMITED YOUR ABILITY TO DO WHAT YOU WANTED: 7
1B. OVER THE PAST 2 WEEKS, HOW MUCH WERE YOU LIMITED BY HEART FAILURE SYMPTOMS (SHORTNESS OF BREATH OR FATIGUE) WHEN WALKING 1 BLOCK ON LEVEL GROUND: 5
6. OVER THE PAST 2 WEEKS, HOW MUCH HAS YOUR HEART FAILURE LIMITED YOUR ENJOYMENT OF LIFE: 5
5. OVER THE PAST 2 WEEKS, ON AVERAGE, HOW MANY TIMES HAVE YOU BEEN FORCED TO SLEEP SITTING UP IN A CHAIR OR WITH AT LEAST 3 PILLOWS TO PROP YOU UP BECAUSE OF SHORTNESS OF BREATH?: 5
8A. OVER THE PAST 2 WEEKS, ON AVERAGE, HOW HAS HEART FAILURE LIMITED YOU ABILITY TO DO HOBBIES OR RECREATIONAL ACTIVITIES: 5
7. IF YOU HAD TO SPEND THE REST OF YOUR LIFE WITH YOUR HEART FAILURE THE WAY IT IS RIGHT NOW, HOW WOULD YOU FEEL ABOUT THIS?: 5

## 2022-07-21 ASSESSMENT — ENCOUNTER SYMPTOMS: SHORTNESS OF BREATH: 0

## 2022-07-21 NOTE — PROGRESS NOTES
[address], [address]  PHONE:  [address]  1945      SUBJECTIVE:   Justin Morris is a 78 y.o. male seen for a follow up visit regarding the following:     Chief Complaint   Patient presents with    Coronary Artery Disease     30 day TAVR f/u       HPI:    Patient presents for follow up after TAVR procedure. This was performed on June 7, 2022. TAVR (6/7/22):  29 mm Hernandez Kwasi S3 valve. Echocardiogram today shows:    07/21/22    TRANSTHORACIC ECHOCARDIOGRAM (TTE) COMPLETE (CONTRAST/BUBBLE/3D PRN) 07/21/2022  1:49 PM (Final)    Interpretation Summary  Formatting of this result is different from the original.      Left Ventricle: Low normal left ventricular systolic function. EF by 2D Simpsons Biplane is 50%. Left ventricle size is normal. Normal wall thickness. Aortic Valve: 29 mm Kwasi S3 TAVR done 6/8/2022. Normal function. No regurgitation. No stenosis. AV mean gradient is 13 mmHg. AV peak gradient is 22 mmHg. AV peak velocity is 2.4 m/s. LVOT:AV VTI Index is 0.50. Mitral Valve: Mild annular calcification of the mitral valve. Mild regurgitation. Contrast used: Definity. Signed by: Jakub Mujica MD on 7/21/2022  1:49 PM    No complaints currently. Coronary artery disease: Underwent multivessel PCI 4/28/2022. No angina. Primary Cardiologist:  Dr. Ulisses Stevenson    Past Medical History, Past Surgical History, Family history, Social History, and Medications were all reviewed with the patient today and updated as necessary.            Current Outpatient Medications:     ticagrelor (BRILINTA) 90 MG TABS tablet, Take 1 tablet by mouth 2 times daily, Disp: 180 tablet, Rfl: 3    lisinopril-hydroCHLOROthiazide (PRINZIDE;ZESTORETIC) 20-25 MG per tablet, Take 1 tablet by mouth daily, Disp: 30 tablet, Rfl: 5    pitavastatin (LIVALO) 4 MG TABS tablet, Take 1 tablet by mouth daily, Disp: 90 tablet, Rfl: 3    mupirocin (BACTROBAN) 2 % ointment, Apply topically daily Apply topically 3 times daily. (Patient not taking: Reported on 6/23/2022), Disp: , Rfl:     aspirin 81 MG EC tablet, Take 81 mg by mouth daily, Disp: , Rfl:     Multiple Vitamin (MULTIVITAMIN PO), Take by mouth daily, Disp: , Rfl:     cetirizine (ZYRTEC) 10 MG tablet, Take 10 mg by mouth daily, Disp: , Rfl:     VITAMIN E PO, Take by mouth, Disp: , Rfl:     VITAMIN D PO, Take by mouth, Disp: , Rfl:     Omega-3 Fatty Acids (FISH OIL PO), Take by mouth daily, Disp: , Rfl:     Cyanocobalamin (VITAMIN B-12 PO), Take by mouth daily, Disp: , Rfl:      Allergies   Allergen Reactions    Epinephrine      \"nervousness\"        Patient Active Problem List    Diagnosis Date Noted    S/P TAVR (transcatheter aortic valve replacement) 06/07/2022     Priority: Medium     TAVR (6/7/22):  29 mm Hernandez Kwasi S3 valve. Coronary artery disease involving native coronary artery of native heart without angina pectoris 05/18/2022     Priority: Low    Aortic valve stenosis 05/18/2022     Priority: Low    LBBB (left bundle branch block) 04/19/2022     Priority: Low    Primary hypertension 04/19/2022     Priority: Low    Pure hypercholesterolemia 04/19/2022     Priority: Low    Family history of cardiovascular disease 04/19/2022     Priority: Low    Bilateral deafness 04/19/2022     Priority: Low    Murmur 04/19/2022     Priority: Low    Chest discomfort 04/19/2022     Priority: Low     Added automatically from request for surgery [id number]            Social History     Tobacco Use    Smoking status: Never    Smokeless tobacco: Never   Substance Use Topics    Alcohol use: Yes     Comment: socially       ROS:    Review of Systems   Constitutional: Negative for malaise/fatigue. Cardiovascular:  Negative for chest pain. Respiratory:  Negative for shortness of breath. Musculoskeletal:  Positive for arthritis. Neurological:  Negative for focal weakness. Psychiatric/Behavioral:  Negative for depression. PHYSICAL EXAM:  Wt Readings from Last 3 Encounters:   07/21/22 213 lb (96.6 kg)   06/23/22 213 lb (96.6 kg)   06/08/22 212 lb (96.2 kg)     BP Readings from Last 3 Encounters:   07/21/22 128/70   06/23/22 125/76   06/08/22 135/64     Pulse Readings from Last 3 Encounters:   07/21/22 70   06/23/22 76   06/08/22 75       Physical Exam  Constitutional:       General: He is not in acute distress. Neck:      Vascular: No carotid bruit. Cardiovascular:      Rate and Rhythm: Normal rate and regular rhythm. Heart sounds: Murmur (Grade I/VI RENÉE) heard. Pulmonary:      Effort: No respiratory distress. Breath sounds: Normal breath sounds. Abdominal:      General: Bowel sounds are normal.      Palpations: Abdomen is soft. Musculoskeletal:         General: No swelling. Skin:     General: Skin is warm and dry. Neurological:      General: No focal deficit present. Psychiatric:         Mood and Affect: Mood normal.       Medical problems and test results were reviewed with the patient today. Lab Results   Component Value Date    WBC 8.9 06/08/2022    HGB 12.5 (L) 06/08/2022    HCT 37.0 (L) 06/08/2022    MCV 96.4 06/08/2022     06/08/2022       Lab Results   Component Value Date/Time     06/08/2022 07:14 AM    K 3.5 06/08/2022 07:14 AM     06/08/2022 07:14 AM    CO2 26 06/08/2022 07:14 AM    BUN 16 06/08/2022 07:14 AM    CREATININE 0.90 06/08/2022 07:14 AM    GLUCOSE 109 06/08/2022 07:14 AM    CALCIUM 8.6 06/08/2022 07:14 AM        No results found for: CHOL  No results found for: TRIG  No results found for: HDL  No results found for: LDLCHOLESTEROL, LDLCALC  No results found for: LABVLDL, VLDL  No results found for: CHOLHDLRATIO     Data from outside records/labs from outside providers have been reviewed and summarized as noted in the HPI, past history and data review sections of this note       ASSESSMENT and PLAN      1. S/P TAVR (transcatheter aortic valve replacement)  Patient with normally functioning bioprosthetic valve on echocardiogram.  We discussed the plan to continue Brilinta for 6 months after transcatheter aortic valve placement and repeat echo at 1 year after implantation. We also discussed the need for antibiotic prophylaxis prior to any dental work. Patient will follow up with their primary cardiologist at this point. We will arrange follow-up appointment in 3 months. I would happy to assist in future care if needed. 2. Coronary artery disease involving native coronary artery of native heart without angina pectoris  Patient is doing well without any anginal symptoms. Continue Aspirin 81 mg a day, Plavix 75 mg a day and PRN NTG. We discussed the importance of continued risk factor modification. The patient is encouraged to contact my office with any worsening dyspnea or chest discomfort. 3. LBBB (left bundle branch block)  Patient with EKG consistent with conduction system disease. Currently asymptomatic. Patient to call if develops symptoms of dizziness, syncope or change in exercise tolerance. 4. Bilateral deafness  Noted. Lesley Marcos MD  7/21/2022  1:36 PM    This note may have been dictated using speech recognition software.   As a result, error of speech recognition may have occurred

## 2022-12-02 ENCOUNTER — OFFICE VISIT (OUTPATIENT)
Dept: CARDIOLOGY CLINIC | Age: 77
End: 2022-12-02

## 2022-12-02 VITALS
DIASTOLIC BLOOD PRESSURE: 75 MMHG | WEIGHT: 210 LBS | SYSTOLIC BLOOD PRESSURE: 139 MMHG | HEART RATE: 78 BPM | HEIGHT: 67 IN | BODY MASS INDEX: 32.96 KG/M2

## 2022-12-02 DIAGNOSIS — E78.00 PURE HYPERCHOLESTEROLEMIA: ICD-10-CM

## 2022-12-02 DIAGNOSIS — I10 PRIMARY HYPERTENSION: ICD-10-CM

## 2022-12-02 DIAGNOSIS — I25.10 CORONARY ARTERY DISEASE INVOLVING NATIVE CORONARY ARTERY OF NATIVE HEART WITHOUT ANGINA PECTORIS: ICD-10-CM

## 2022-12-02 DIAGNOSIS — Z95.820 S/P ANGIOPLASTY WITH STENT: ICD-10-CM

## 2022-12-02 DIAGNOSIS — I35.0 NONRHEUMATIC AORTIC VALVE STENOSIS: ICD-10-CM

## 2022-12-02 DIAGNOSIS — Z95.2 S/P TAVR (TRANSCATHETER AORTIC VALVE REPLACEMENT): Primary | Chronic | ICD-10-CM

## 2022-12-02 DIAGNOSIS — H91.93 BILATERAL DEAFNESS: ICD-10-CM

## 2022-12-02 ASSESSMENT — ENCOUNTER SYMPTOMS
ORTHOPNEA: 0
SHORTNESS OF BREATH: 0

## 2022-12-02 NOTE — PROGRESS NOTES
[address], [address] [facility], [address] [place] [address]  PHONE:  [address]  [birth year]      SUBJECTIVE:   Duglas Montes is a 78 y.o. male seen for a follow up visit regarding the following:     Chief Complaint   Patient presents with    Coronary Artery Disease       HPI:    74-year-old male who is deaf and comes back today for follow-up. He had known aortic stenosis and worsening angina had him do a catheterization and a two-vessel angioplasty and stenting on Brilinta by Dr. Andrea Kendall to get referred over for TAVR for his aortic valve eventually had that and he did very well so far. Follow-up with  clinically stable. Has been doing very well with no chest pain or shortness of breath. Says some sore discomfort on her left upper quadrant under his diaphragm area and he rubs it and feels better not sure what that is exactly but does not like it is a cardiac issue. Past Medical History, Past Surgical History, Family history, Social History, and Medications were all reviewed with the patient today and updated as necessary.        Allergies   Allergen Reactions    Epinephrine      \"nervousness\"     Past Medical History:   Diagnosis Date    Asthma     Dx as a child    CAD (coronary artery disease)     Cancer ([facility] [address]     Prostate cancer    H/O echocardiogram 04/20/2022    EF 45-50%    Hypertension     managed with med    Skin cancer     removal from face     Past Surgical History:   Procedure Laterality Date    CARDIAC PROCEDURE N/A 6/7/2022    Transcatheter aortic valve replacement performed by Abisai Segovia MD at [facility]  04/28/2022    Heart cath with stent x 2 placement    CARPAL TUNNEL RELEASE      COLONOSCOPY      EYE SURGERY Bilateral     cataract removal    MOUTH SURGERY      upper inplants x 2     PROSTATECTOMY  2019    TONSILLECTOMY       Family History   Problem Relation Age of Onset    Stroke Father     High Cholesterol Father     Heart Disease Father     High Blood Pressure Sister     Heart Disease Sister     Diabetes Sister     Cancer Maternal Grandmother     Cancer Maternal Grandfather     Alzheimer's Disease Mother     Diabetes Paternal Grandmother       Social History     Tobacco Use    Smoking status: Never    Smokeless tobacco: Never   Substance Use Topics    Alcohol use: Yes     Comment: socially       ROS:    Review of Systems   Constitutional: Negative for decreased appetite and weight loss. Cardiovascular:  Negative for chest pain, dyspnea on exertion, irregular heartbeat, orthopnea and palpitations. Respiratory:  Negative for shortness of breath. PHYSICAL EXAM:    /75   Pulse 78   Ht 5' 7\" (1.702 m)   Wt 210 lb (95.3 kg)   BMI 32.89 kg/m²        Wt Readings from Last 3 Encounters:   12/02/22 210 lb (95.3 kg)   07/21/22 209 lb (94.8 kg)   07/21/22 213 lb (96.6 kg)     BP Readings from Last 3 Encounters:   12/02/22 139/75   07/21/22 122/74   07/21/22 128/70         Physical Exam  Constitutional:       General: He is not in acute distress. Cardiovascular:      Rate and Rhythm: Normal rate. Heart sounds: No murmur heard. No gallop. Abdominal:      Palpations: Abdomen is soft. Tenderness: There is no abdominal tenderness. Musculoskeletal:         General: No swelling. Neurological:      Mental Status: He is alert. Medical problems and test results were reviewed with the patient today. No results found for any visits on 12/02/22. Lab Results   Component Value Date/Time     06/08/2022 07:14 AM    K 3.5 06/08/2022 07:14 AM     06/08/2022 07:14 AM    CO2 26 06/08/2022 07:14 AM    BUN 16 06/08/2022 07:14 AM    GFRAA >60 06/08/2022 07:14 AM           ASSESSMENT and Esme Mcclain was seen today for coronary artery disease.     Diagnoses and all orders for this visit:    S/P TAVR (transcatheter aortic valve replacement) that is currently stable    Primary hypertension stable    Coronary artery disease involving native coronary artery of native heart without angina pectoris s/p stents no angina    Nonrheumatic aortic valve stenosis    S/P angioplasty with stent stay on brilinta 1 year    Bilateral deafness unchanged    Pure hypercholesterolemia stay on statins        [unfilled]      No follow-up provider specified.     Raj Tang MD  12/2/2022  1:54 PM

## 2022-12-06 ENCOUNTER — TELEPHONE (OUTPATIENT)
Dept: CARDIOLOGY CLINIC | Age: 77
End: 2022-12-06

## 2022-12-06 NOTE — TELEPHONE ENCOUNTER
Pt wife states pt has come down with a cold and is wondering if he can take any cold meds and if so what can he take.

## 2023-02-08 ENCOUNTER — TELEPHONE (OUTPATIENT)
Dept: CARDIOLOGY CLINIC | Age: 78
End: 2023-02-08

## 2023-02-08 NOTE — TELEPHONE ENCOUNTER
Receoved request for PA from [facility] for Livalo 4 mg. Spoke with Sherie Serra approved from 1-9-23 to 2-8-24.  Case number [id number]//[email or web address]

## 2023-02-09 ENCOUNTER — TELEPHONE (OUTPATIENT)
Dept: CARDIOLOGY CLINIC | Age: 78
End: 2023-02-09

## 2023-03-30 PROBLEM — R01.1 MURMUR: Status: RESOLVED | Noted: 2022-04-19 | Resolved: 2023-03-30

## 2023-03-30 PROBLEM — Z82.49 FAMILY HISTORY OF CARDIOVASCULAR DISEASE: Status: RESOLVED | Noted: 2022-04-19 | Resolved: 2023-03-30

## 2023-03-30 PROBLEM — R07.89 CHEST DISCOMFORT: Status: RESOLVED | Noted: 2022-04-19 | Resolved: 2023-03-30

## 2023-03-31 ENCOUNTER — OFFICE VISIT (OUTPATIENT)
Dept: CARDIOLOGY CLINIC | Age: 78
End: 2023-03-31

## 2023-03-31 VITALS
WEIGHT: 210.4 LBS | HEIGHT: 67 IN | BODY MASS INDEX: 33.02 KG/M2 | HEART RATE: 62 BPM | DIASTOLIC BLOOD PRESSURE: 60 MMHG | SYSTOLIC BLOOD PRESSURE: 126 MMHG

## 2023-03-31 DIAGNOSIS — E78.00 PURE HYPERCHOLESTEROLEMIA: ICD-10-CM

## 2023-03-31 DIAGNOSIS — Z95.2 S/P TAVR (TRANSCATHETER AORTIC VALVE REPLACEMENT): Primary | Chronic | ICD-10-CM

## 2023-03-31 DIAGNOSIS — I10 PRIMARY HYPERTENSION: ICD-10-CM

## 2023-03-31 DIAGNOSIS — I25.10 CORONARY ARTERY DISEASE INVOLVING NATIVE CORONARY ARTERY OF NATIVE HEART WITHOUT ANGINA PECTORIS: ICD-10-CM

## 2023-03-31 DIAGNOSIS — I44.7 LBBB (LEFT BUNDLE BRANCH BLOCK): ICD-10-CM

## 2023-03-31 ASSESSMENT — ENCOUNTER SYMPTOMS: SHORTNESS OF BREATH: 0

## 2023-03-31 NOTE — PROGRESS NOTES
[address] [place]  [address], [address]  PHONE:  [address]  2/[birth year]4      SUBJECTIVE:   Hernán Brito is a 78 y.o. male seen for a follow up visit regarding the following:     Chief Complaint   Patient presents with    Hypertension    Coronary Artery Disease    Other     S/p TAVR       HPI:    Hernán Brito presents for routine follow up for known Coronary Artery Disease. Multiple issues addressed as outlined below:     Coronary Artery Disease: Had multivessel PCI.  4/29 will be one year. Now on ASA and Brilinta. S/P TAVR:  Doing well. Hypertension:  Ambulatory BP readings have been controlled. Patient reports compliance with medical therapy without side effects. Hyperlipidemia:  Tolerating Livalo. Past Medical History, Past Surgical History, Family history, Social History, and Medications were all reviewed with the patient today and updated as necessary. Current Outpatient Medications:     lisinopril-hydroCHLOROthiazide (PRINZIDE;ZESTORETIC) 20-25 MG per tablet, Take 1 tablet by mouth in the morning., Disp: 30 tablet, Rfl: 11    pitavastatin (LIVALO) 4 MG TABS tablet, Take 1 tablet by mouth in the morning., Disp: 30 tablet, Rfl: 11    ticagrelor (BRILINTA) 90 MG TABS tablet, Take 1 tablet by mouth in the morning and 1 tablet before bedtime. , Disp: 60 tablet, Rfl: 11    mupirocin (BACTROBAN) 2 % ointment, Apply topically daily Apply topically 3 times daily. , Disp: , Rfl:     aspirin 81 MG EC tablet, Take 81 mg by mouth daily, Disp: , Rfl:     Multiple Vitamin (MULTIVITAMIN PO), Take by mouth daily, Disp: , Rfl:     cetirizine (ZYRTEC) 10 MG tablet, Take 10 mg by mouth daily, Disp: , Rfl:     VITAMIN E PO, Take by mouth, Disp: , Rfl:     VITAMIN D PO, Take by mouth, Disp: , Rfl:     Omega-3 Fatty Acids (FISH OIL PO), Take by mouth daily, Disp: , Rfl:     Cyanocobalamin (VITAMIN B-12 PO), Take by

## 2023-07-06 ENCOUNTER — TELEPHONE (OUTPATIENT)
Age: 78
End: 2023-07-06

## 2023-07-06 ASSESSMENT — KANSAS CITY CARDIOMYOPATHY QUESTIONNAIRE (KCCQ12)
5. OVER THE PAST 2 WEEKS, ON AVERAGE, HOW MANY TIMES HAVE YOU BEEN FORCED TO SLEEP SITTING UP IN A CHAIR OR WITH AT LEAST 3 PILLOWS TO PROP YOU UP BECAUSE OF SHORTNESS OF BREATH?: 5
2. OVER THE PAST 2 WEEKS, HOW MANY TIMES DID YOU HAVE SWELLING IN YOUR FEET, ANKLES OR LEGS WHEN YOU WOKE UP IN THE MORNING: 5
8C. OVER THE PAST 2 WEEKS, ON AVERAGE, HOW HAS HEART FAILURE LIMITED YOU ABILITY TO VISIT FAMILY AND FRIENDS OUR OF YOUR HOME: 5
8A. OVER THE PAST 2 WEEKS, ON AVERAGE, HOW HAS HEART FAILURE LIMITED YOU ABILITY TO DO HOBBIES OR RECREATIONAL ACTIVITIES: 5
3. OVER THE PAST 2 WEEKS, ON AVERAGE, HOW MANY TIMES HAS FATIGUE LIMITED YOUR ABILITY TO DO WHAT YOU WANTED: 7
1C. OVER THE PAST 2 WEEKS, HOW MUCH WERE YOU LIMITED BY HEART FAILURE SYMPTOMS (SHORTNESS OF BREATH OR FATIGUE) WHEN HURRYING OR JOGGING (AS IF TO CATCH A BUS): 5
4. OVER THE PAST 2 WEEKS, ON AVERAGE, HOW MANY TIMES HAS SHORTNESS OF BREATH LIMITED YOUR ABILITY TO DO WHAT YOU WANTED: 7
6. OVER THE PAST 2 WEEKS, HOW MUCH HAS YOUR HEART FAILURE LIMITED YOUR ENJOYMENT OF LIFE: 5
1A. OVER THE PAST 2 WEEKS, HOW MUCH WERE YOU LIMITED BY HEART FAILURE SYMPTOMS (SHORTNESS OF BREATH OR FATIGUE) WHEN SHOWERING OR BATHING: 5
8B. OVER THE PAST 2 WEEKS, ON AVERAGE, HOW HAS HEART FAILURE LIMITED YOU ABILITY TO WORK OR DO HOUSEHOLD CHORES: 5
1B. OVER THE PAST 2 WEEKS, HOW MUCH WERE YOU LIMITED BY HEART FAILURE SYMPTOMS (SHORTNESS OF BREATH OR FATIGUE) WHEN WALKING 1 BLOCK ON LEVEL GROUND: 5
7. IF YOU HAD TO SPEND THE REST OF YOUR LIFE WITH YOUR HEART FAILURE THE WAY IT IS RIGHT NOW, HOW WOULD YOU FEEL ABOUT THIS?: 5

## 2023-07-06 NOTE — TELEPHONE ENCOUNTER
Wife notified of results, voiced understanding and thanked me//judy  ----- Message from Edward Patten MD sent at 7/5/2023  9:00 PM EDT -----  Please call patient. Echo looks good. Valve working normally. Good result.    Thank you

## 2023-08-16 ENCOUNTER — TELEPHONE (OUTPATIENT)
Age: 78
End: 2023-08-16

## 2023-08-16 RX ORDER — PITAVASTATIN CALCIUM 4.18 MG/1
4 TABLET, FILM COATED ORAL DAILY
Qty: 90 TABLET | Refills: 3 | Status: SHIPPED | OUTPATIENT
Start: 2023-08-16

## 2023-08-16 NOTE — TELEPHONE ENCOUNTER
Prescription sent to pharmacy//brendab  Requested Prescriptions     Signed Prescriptions Disp Refills    pitavastatin (LIVALO) 4 MG TABS tablet 90 tablet 3     Sig: Take 1 tablet by mouth daily     Authorizing Provider: Chelsea Freed User: Crystal Body: 2023   1:09 PM EDT  To: [facility] Clinical Staff  Subject: Prescription refill                              Dr. Katerina Damon prescribed 4mg Livalo for [facility] [employer] (MXD[facility]). [facility] in [place], [place] says the prescription is . Paola Marcum needs it refilled. He has 6 pills left. I think a prescription for Livalo was sent to [facility], but we don't want to have anything to do with [facility] now.

## 2023-08-16 NOTE — TELEPHONE ENCOUNTER
MEDICATION REFILL REQUEST      Name of Medication:  Pitavastatin  Dose:  4 mg  Frequency:  QD  Quantity:  30  Days' supply:  30 with refills      Pharmacy Name/Location:  [phone], please call in asap ,pt only has a couple pills left

## 2023-10-05 ENCOUNTER — OFFICE VISIT (OUTPATIENT)
Age: 78
End: 2023-10-05
Payer: MEDICARE

## 2023-10-05 VITALS
DIASTOLIC BLOOD PRESSURE: 82 MMHG | BODY MASS INDEX: 32.87 KG/M2 | HEART RATE: 54 BPM | WEIGHT: 209.4 LBS | SYSTOLIC BLOOD PRESSURE: 136 MMHG | HEIGHT: 67 IN

## 2023-10-05 DIAGNOSIS — E78.00 PURE HYPERCHOLESTEROLEMIA: ICD-10-CM

## 2023-10-05 DIAGNOSIS — I25.10 CORONARY ARTERY DISEASE INVOLVING NATIVE CORONARY ARTERY OF NATIVE HEART WITHOUT ANGINA PECTORIS: ICD-10-CM

## 2023-10-05 DIAGNOSIS — I44.7 LBBB (LEFT BUNDLE BRANCH BLOCK): ICD-10-CM

## 2023-10-05 DIAGNOSIS — Z95.2 S/P TAVR (TRANSCATHETER AORTIC VALVE REPLACEMENT): Chronic | ICD-10-CM

## 2023-10-05 DIAGNOSIS — I10 PRIMARY HYPERTENSION: Primary | ICD-10-CM

## 2023-10-05 PROBLEM — I35.0 AORTIC VALVE STENOSIS: Status: RESOLVED | Noted: 2022-05-18 | Resolved: 2023-10-05

## 2023-10-05 PROCEDURE — 1123F ACP DISCUSS/DSCN MKR DOCD: CPT | Performed by: INTERNAL MEDICINE

## 2023-10-05 PROCEDURE — G8484 FLU IMMUNIZE NO ADMIN: HCPCS | Performed by: INTERNAL MEDICINE

## 2023-10-05 PROCEDURE — G8417 CALC BMI ABV UP PARAM F/U: HCPCS | Performed by: INTERNAL MEDICINE

## 2023-10-05 PROCEDURE — G8428 CUR MEDS NOT DOCUMENT: HCPCS | Performed by: INTERNAL MEDICINE

## 2023-10-05 PROCEDURE — 1036F TOBACCO NON-USER: CPT | Performed by: INTERNAL MEDICINE

## 2023-10-05 PROCEDURE — 93000 ELECTROCARDIOGRAM COMPLETE: CPT | Performed by: INTERNAL MEDICINE

## 2023-10-05 PROCEDURE — 3079F DIAST BP 80-89 MM HG: CPT | Performed by: INTERNAL MEDICINE

## 2023-10-05 PROCEDURE — 3075F SYST BP GE 130 - 139MM HG: CPT | Performed by: INTERNAL MEDICINE

## 2023-10-05 PROCEDURE — 99214 OFFICE O/P EST MOD 30 MIN: CPT | Performed by: INTERNAL MEDICINE

## 2023-10-05 RX ORDER — LISINOPRIL AND HYDROCHLOROTHIAZIDE 25; 20 MG/1; MG/1
1 TABLET ORAL DAILY
Qty: 30 TABLET | Refills: 11 | Status: SHIPPED | OUTPATIENT
Start: 2023-10-05

## 2023-10-05 ASSESSMENT — ENCOUNTER SYMPTOMS: SHORTNESS OF BREATH: 0

## 2023-10-05 NOTE — PROGRESS NOTES
sounds: Normal breath sounds. Abdominal:      General: Bowel sounds are normal.      Palpations: Abdomen is soft. Musculoskeletal:         General: No swelling. Skin:     General: Skin is warm and dry. Neurological:      General: No focal deficit present. Psychiatric:         Mood and Affect: Mood normal.         Medical problems and test results were reviewed with the patient today. Lab Results   Component Value Date    WBC 8.9 06/08/2022    HGB 12.5 (L) 06/08/2022    HCT 37.0 (L) 06/08/2022    MCV 96.4 06/08/2022     06/08/2022       Lab Results   Component Value Date/Time     06/08/2022 07:14 AM    K 3.5 06/08/2022 07:14 AM     06/08/2022 07:14 AM    CO2 26 06/08/2022 07:14 AM    BUN 16 06/08/2022 07:14 AM    CREATININE 0.90 06/08/2022 07:14 AM    GLUCOSE 109 06/08/2022 07:14 AM    CALCIUM 8.6 06/08/2022 07:14 AM        No results found for: \"CHOL\"  No results found for: \"TRIG\"  No results found for: \"HDL\"  No results found for: \"LDLCHOLESTEROL\", \"LDLCALC\"  No results found for: \"LABVLDL\", \"VLDL\"  No results found for: \"CHOLHDLRATIO\"     Data from outside records/labs from outside providers have been reviewed and summarized as noted in the HPI, past history and data review sections of this note     EKG done today and reviewed with patient showed:  Sinus  Bradycardia   -Left bundle branch block. Rate 53    ASSESSMENT and PLAN      1. Primary hypertension  Currently BP appears to be under acceptable control on current therapy. Continue current medications including Lisinopril/HCTZ. Discussed monitoring ambulatory BP readings to ensure continued optimal management. If BP becomes elevated, patient is encouraged to contact my office for further titration of therapy. - EKG 12 Lead    2. S/P TAVR (transcatheter aortic valve replacement)  Normal valve function. Continue ASA    3.  Coronary artery disease involving native coronary artery of native heart without angina

## 2024-01-23 ENCOUNTER — TELEPHONE (OUTPATIENT)
Age: 79
End: 2024-01-23

## 2024-01-23 NOTE — TELEPHONE ENCOUNTER
Spoke to pt's wife. Made aware there is no reason from a cardiac standpoint that pt shouldn't be able to take amoxicillin and hydrocodone for his abscessed tooth. Verb understanding.

## 2024-01-26 ENCOUNTER — TELEPHONE (OUTPATIENT)
Age: 79
End: 2024-01-26

## 2024-01-26 NOTE — TELEPHONE ENCOUNTER
CaseId:[id number];Status:Approved;Review Type:Prior Auth;Coverage Start Date:12/27/2023;Coverage End Date:01/25/2025;   Pharmacy, [facility], pharmacist and patient notified of approval//brendab

## 2024-04-15 ENCOUNTER — OFFICE VISIT (OUTPATIENT)
Age: 79
End: 2024-04-15
Payer: MEDICARE

## 2024-04-15 VITALS
BODY MASS INDEX: 34.65 KG/M2 | HEART RATE: 100 BPM | DIASTOLIC BLOOD PRESSURE: 60 MMHG | HEIGHT: 67 IN | SYSTOLIC BLOOD PRESSURE: 124 MMHG | WEIGHT: 220.8 LBS

## 2024-04-15 DIAGNOSIS — Z95.2 S/P TAVR (TRANSCATHETER AORTIC VALVE REPLACEMENT): Chronic | ICD-10-CM

## 2024-04-15 DIAGNOSIS — E78.2 MIXED HYPERLIPIDEMIA: ICD-10-CM

## 2024-04-15 DIAGNOSIS — I10 PRIMARY HYPERTENSION: ICD-10-CM

## 2024-04-15 DIAGNOSIS — I44.7 LBBB (LEFT BUNDLE BRANCH BLOCK): ICD-10-CM

## 2024-04-15 DIAGNOSIS — I25.10 CORONARY ARTERY DISEASE INVOLVING NATIVE CORONARY ARTERY OF NATIVE HEART WITHOUT ANGINA PECTORIS: Primary | ICD-10-CM

## 2024-04-15 PROBLEM — Z95.820 S/P ANGIOPLASTY WITH STENT: Status: RESOLVED | Noted: 2022-12-02 | Resolved: 2024-04-15

## 2024-04-15 PROCEDURE — G8417 CALC BMI ABV UP PARAM F/U: HCPCS | Performed by: INTERNAL MEDICINE

## 2024-04-15 PROCEDURE — G8427 DOCREV CUR MEDS BY ELIG CLIN: HCPCS | Performed by: INTERNAL MEDICINE

## 2024-04-15 PROCEDURE — 1036F TOBACCO NON-USER: CPT | Performed by: INTERNAL MEDICINE

## 2024-04-15 PROCEDURE — 3078F DIAST BP <80 MM HG: CPT | Performed by: INTERNAL MEDICINE

## 2024-04-15 PROCEDURE — 1123F ACP DISCUSS/DSCN MKR DOCD: CPT | Performed by: INTERNAL MEDICINE

## 2024-04-15 PROCEDURE — 99214 OFFICE O/P EST MOD 30 MIN: CPT | Performed by: INTERNAL MEDICINE

## 2024-04-15 PROCEDURE — 3074F SYST BP LT 130 MM HG: CPT | Performed by: INTERNAL MEDICINE

## 2024-04-15 ASSESSMENT — ENCOUNTER SYMPTOMS: SHORTNESS OF BREATH: 0

## 2024-04-15 NOTE — PROGRESS NOTES
[address], [place] [address]  PHONE: [phone]    John Grigsby  1945      SUBJECTIVE:   John Grigsby is a 78 y.o. male seen for a follow up visit regarding the following:     Chief Complaint   Patient presents with    Hypertension    Coronary Artery Disease       HPI:    John Grigsby presents for routine follow up for known Coronary Artery Disease. Multiple issues addressed as outlined below:     Coronary Artery Disease:  Patient denies any recent angina.  Notes compliance with medical therapy.  No recent NTG use.  No intolerance to anti-platelet therapy. No blood in stool or melena.     TAVR:  No dizziness or syncope.  No chest pain.     LBBB: No dizziness or syncope    Hypertension:  Ambulatory BP readings have been controlled.  Patient reports compliance with medical therapy without side effects.      Hyperlipidemia:   Tolerating Livalo.        Past Medical History, Past Surgical History, Family history, Social History, and Medications were all reviewed with the patient today and updated as necessary.           Current Outpatient Medications:     lisinopril-hydroCHLOROthiazide (PRINZIDE;ZESTORETIC) 20-25 MG per tablet, Take 1 tablet by mouth daily, Disp: 30 tablet, Rfl: 11    pitavastatin (LIVALO) 4 MG TABS tablet, Take 1 tablet by mouth daily, Disp: 90 tablet, Rfl: 3    aspirin 81 MG EC tablet, Take 1 tablet by mouth daily, Disp: , Rfl:     Multiple Vitamin (MULTIVITAMIN PO), Take by mouth daily, Disp: , Rfl:     cetirizine (ZYRTEC) 10 MG tablet, Take 1 tablet by mouth daily, Disp: , Rfl:     Cyanocobalamin (VITAMIN B-12 PO), Take by mouth daily, Disp: , Rfl:      Allergies   Allergen Reactions    Lipitor [Atorvastatin] Myalgia    Epinephrine      \"nervousness\"        Patient Active Problem List    Diagnosis Date Noted    S/P TAVR (transcatheter aortic valve replacement) 06/07/2022     Priority: Medium     1.  TAVR (6/7/22):  29

## 2024-05-30 ENCOUNTER — TELEPHONE (OUTPATIENT)
Age: 79
End: 2024-05-30

## 2024-05-30 NOTE — TELEPHONE ENCOUNTER
Needs risk assessment for Cochlear implant   Only on Aspirin 81 mg    Last office visit 4-15-24  Last echo 7-5-23

## 2024-05-30 NOTE — TELEPHONE ENCOUNTER
Patient having Cochlear implant with Dr. Youngblood under General. Requesting risk assessment and any mediation hold for 5 days prior. Fax: [phone]

## 2024-07-11 ENCOUNTER — TELEPHONE (OUTPATIENT)
Age: 79
End: 2024-07-11

## 2024-07-11 NOTE — TELEPHONE ENCOUNTER
Patients wife called stating her  has the following issues :    Yesterday was in Afib  Intermittent CP over the last two daus  Could not sleep on left side  Sore throat

## 2024-07-11 NOTE — TELEPHONE ENCOUNTER
Pt's Brisa hernandez, was given information from Dr. Belle, she will have pt here at the office at 8:00 AM for a visit.            Bubba Belle MD Riddle, Gina, RN  Caller: Unspecified (Today,  1:13 PM)  He needs to be seen if he is having chest pain.  If he has active pain he needs to go to the ER.  If he can come tomorrow at 8:00 I will see him then.

## 2024-07-11 NOTE — TELEPHONE ENCOUNTER
Pt reports dull intermittent non radiating chest pain which he rates 3/10 on pain scale Tuesday 7/9 and Wednesday 7/10. The chest pain increased in intensity 6/10 (dull, non radiating) when he attempted to sleep on his left side. Pt denies SOB, palpitations, lightheadedness, dizziness, or pre/syncope. Pt takes all medications as instructed without missing any doses. Pt's wife was instructed how to take an ECG using apple watch, then uploaded on [facility] for review.

## 2024-07-12 ENCOUNTER — OFFICE VISIT (OUTPATIENT)
Age: 79
End: 2024-07-12
Payer: MEDICARE

## 2024-07-12 VITALS
HEART RATE: 45 BPM | SYSTOLIC BLOOD PRESSURE: 122 MMHG | DIASTOLIC BLOOD PRESSURE: 82 MMHG | WEIGHT: 217 LBS | HEIGHT: 67 IN | BODY MASS INDEX: 34.06 KG/M2

## 2024-07-12 DIAGNOSIS — I10 PRIMARY HYPERTENSION: Primary | ICD-10-CM

## 2024-07-12 DIAGNOSIS — R07.89 OTHER CHEST PAIN: ICD-10-CM

## 2024-07-12 DIAGNOSIS — Z95.2 S/P TAVR (TRANSCATHETER AORTIC VALVE REPLACEMENT): Chronic | ICD-10-CM

## 2024-07-12 DIAGNOSIS — R00.1 SYMPTOMATIC BRADYCARDIA: ICD-10-CM

## 2024-07-12 DIAGNOSIS — I25.10 CORONARY ARTERY DISEASE INVOLVING NATIVE CORONARY ARTERY OF NATIVE HEART WITHOUT ANGINA PECTORIS: ICD-10-CM

## 2024-07-12 PROCEDURE — 1036F TOBACCO NON-USER: CPT | Performed by: INTERNAL MEDICINE

## 2024-07-12 PROCEDURE — 3079F DIAST BP 80-89 MM HG: CPT | Performed by: INTERNAL MEDICINE

## 2024-07-12 PROCEDURE — 3074F SYST BP LT 130 MM HG: CPT | Performed by: INTERNAL MEDICINE

## 2024-07-12 PROCEDURE — 99214 OFFICE O/P EST MOD 30 MIN: CPT | Performed by: INTERNAL MEDICINE

## 2024-07-12 PROCEDURE — 1123F ACP DISCUSS/DSCN MKR DOCD: CPT | Performed by: INTERNAL MEDICINE

## 2024-07-12 PROCEDURE — G8427 DOCREV CUR MEDS BY ELIG CLIN: HCPCS | Performed by: INTERNAL MEDICINE

## 2024-07-12 PROCEDURE — 93000 ELECTROCARDIOGRAM COMPLETE: CPT | Performed by: INTERNAL MEDICINE

## 2024-07-12 PROCEDURE — G8417 CALC BMI ABV UP PARAM F/U: HCPCS | Performed by: INTERNAL MEDICINE

## 2024-07-12 RX ORDER — SODIUM CHLORIDE 0.9 % (FLUSH) 0.9 %
5-40 SYRINGE (ML) INJECTION PRN
OUTPATIENT
Start: 2024-07-12

## 2024-07-12 RX ORDER — SODIUM CHLORIDE 9 MG/ML
INJECTION, SOLUTION INTRAVENOUS PRN
OUTPATIENT
Start: 2024-07-12

## 2024-07-12 RX ORDER — SERTRALINE HYDROCHLORIDE 25 MG/1
1 TABLET, FILM COATED ORAL DAILY
COMMUNITY
Start: 2024-03-22

## 2024-07-12 RX ORDER — SODIUM CHLORIDE 0.9 % (FLUSH) 0.9 %
5-40 SYRINGE (ML) INJECTION EVERY 12 HOURS SCHEDULED
OUTPATIENT
Start: 2024-07-12

## 2024-07-12 RX ORDER — CELECOXIB 200 MG/1
200 CAPSULE ORAL DAILY
COMMUNITY

## 2024-07-12 ASSESSMENT — ENCOUNTER SYMPTOMS: SHORTNESS OF BREATH: 0

## 2024-07-12 NOTE — PROGRESS NOTES
Patient pre-assessment complete for Seb scheduled for PPM, arrival time 1130. Patient verified using . Patient instructed to bring a list of all home medications on the day of procedure. NPO status reinforced. Instructed they can take all other medications excluding vitamins & supplements. Patient verbalizes understanding of all instructions & denies any questions at this time.

## 2024-07-15 ENCOUNTER — HOSPITAL ENCOUNTER (OUTPATIENT)
Age: 79
Setting detail: OBSERVATION
Discharge: HOME | End: 2024-07-16
Attending: INTERNAL MEDICINE | Admitting: INTERNAL MEDICINE
Payer: MEDICARE

## 2024-07-15 ENCOUNTER — APPOINTMENT (OUTPATIENT)
Dept: GENERAL RADIOLOGY | Age: 79
End: 2024-07-15
Attending: INTERNAL MEDICINE
Payer: MEDICARE

## 2024-07-15 DIAGNOSIS — R00.1 BRADYCARDIA: ICD-10-CM

## 2024-07-15 PROBLEM — I49.5 SSS (SICK SINUS SYNDROME) (HCC): Status: ACTIVE | Noted: 2024-07-15

## 2024-07-15 PROBLEM — Z95.0 PACEMAKER: Status: ACTIVE | Noted: 2024-07-15

## 2024-07-15 LAB
ANION GAP SERPL CALC-SCNC: 10 MMOL/L (ref 9–18)
BUN SERPL-MCNC: 20 MG/DL (ref 8–23)
CALCIUM SERPL-MCNC: 9.1 MG/DL (ref 8.8–10.2)
CHLORIDE SERPL-SCNC: 102 MMOL/L (ref 98–107)
CO2 SERPL-SCNC: 25 MMOL/L (ref 20–28)
CREAT SERPL-MCNC: 0.77 MG/DL (ref 0.8–1.3)
ECHO BSA: 2.16 M2
EKG ATRIAL RATE: 61 BPM
EKG DIAGNOSIS: NORMAL
EKG P AXIS: 68 DEGREES
EKG P-R INTERVAL: 230 MS
EKG Q-T INTERVAL: 464 MS
EKG QRS DURATION: 148 MS
EKG QTC CALCULATION (BAZETT): 467 MS
EKG R AXIS: -3 DEGREES
EKG T AXIS: 127 DEGREES
EKG VENTRICULAR RATE: 61 BPM
ERYTHROCYTE [DISTWIDTH] IN BLOOD BY AUTOMATED COUNT: 12.5 % (ref 11.9–14.6)
GLUCOSE SERPL-MCNC: 99 MG/DL (ref 70–99)
HCT VFR BLD AUTO: 38.9 % (ref 41.1–50.3)
HGB BLD-MCNC: 13.1 G/DL (ref 13.6–17.2)
MCH RBC QN AUTO: 33.1 PG (ref 26.1–32.9)
MCHC RBC AUTO-ENTMCNC: 33.7 G/DL (ref 31.4–35)
MCV RBC AUTO: 98.2 FL (ref 82–102)
NRBC # BLD: 0 K/UL (ref 0–0.2)
PLATELET # BLD AUTO: 249 K/UL (ref 150–450)
PMV BLD AUTO: 9.3 FL (ref 9.4–12.3)
POTASSIUM SERPL-SCNC: 4 MMOL/L (ref 3.5–5.1)
RBC # BLD AUTO: 3.96 M/UL (ref 4.23–5.6)
SODIUM SERPL-SCNC: 137 MMOL/L (ref 136–145)
WBC # BLD AUTO: 7.1 K/UL (ref 4.3–11.1)

## 2024-07-15 PROCEDURE — L3660 SO 8 AB RSTR CAN/WEB PRE OTS: HCPCS | Performed by: INTERNAL MEDICINE

## 2024-07-15 PROCEDURE — C1892 INTRO/SHEATH,FIXED,PEEL-AWAY: HCPCS | Performed by: INTERNAL MEDICINE

## 2024-07-15 PROCEDURE — G0378 HOSPITAL OBSERVATION PER HR: HCPCS

## 2024-07-15 PROCEDURE — 85027 COMPLETE CBC AUTOMATED: CPT

## 2024-07-15 PROCEDURE — C1894 INTRO/SHEATH, NON-LASER: HCPCS | Performed by: INTERNAL MEDICINE

## 2024-07-15 PROCEDURE — 93005 ELECTROCARDIOGRAM TRACING: CPT | Performed by: INTERNAL MEDICINE

## 2024-07-15 PROCEDURE — 2720000010 HC SURG SUPPLY STERILE: Performed by: INTERNAL MEDICINE

## 2024-07-15 PROCEDURE — A4217 STERILE WATER/SALINE, 500 ML: HCPCS | Performed by: INTERNAL MEDICINE

## 2024-07-15 PROCEDURE — 93010 ELECTROCARDIOGRAM REPORT: CPT | Performed by: INTERNAL MEDICINE

## 2024-07-15 PROCEDURE — 80048 BASIC METABOLIC PNL TOTAL CA: CPT

## 2024-07-15 PROCEDURE — 6360000002 HC RX W HCPCS: Performed by: INTERNAL MEDICINE

## 2024-07-15 PROCEDURE — C1898 LEAD, PMKR, OTHER THAN TRANS: HCPCS | Performed by: INTERNAL MEDICINE

## 2024-07-15 PROCEDURE — 99153 MOD SED SAME PHYS/QHP EA: CPT | Performed by: INTERNAL MEDICINE

## 2024-07-15 PROCEDURE — 2580000003 HC RX 258: Performed by: INTERNAL MEDICINE

## 2024-07-15 PROCEDURE — 33208 INSRT HEART PM ATRIAL & VENT: CPT | Performed by: INTERNAL MEDICINE

## 2024-07-15 PROCEDURE — 71045 X-RAY EXAM CHEST 1 VIEW: CPT

## 2024-07-15 PROCEDURE — 2580000003 HC RX 258: Performed by: NURSE PRACTITIONER

## 2024-07-15 PROCEDURE — 6360000004 HC RX CONTRAST MEDICATION: Performed by: INTERNAL MEDICINE

## 2024-07-15 PROCEDURE — C1785 PMKR, DUAL, RATE-RESP: HCPCS | Performed by: INTERNAL MEDICINE

## 2024-07-15 PROCEDURE — 2709999900 HC NON-CHARGEABLE SUPPLY: Performed by: INTERNAL MEDICINE

## 2024-07-15 PROCEDURE — 2500000003 HC RX 250 WO HCPCS: Performed by: INTERNAL MEDICINE

## 2024-07-15 PROCEDURE — 99152 MOD SED SAME PHYS/QHP 5/>YRS: CPT | Performed by: INTERNAL MEDICINE

## 2024-07-15 DEVICE — LEAD 5076-45 MRI US RCMCRD
Type: IMPLANTABLE DEVICE | Status: FUNCTIONAL
Brand: CAPSUREFIX NOVUS MRI™ SURESCAN®

## 2024-07-15 DEVICE — IPG W1DR01 AZURE XT DR MRI USA
Type: IMPLANTABLE DEVICE | Site: CHEST  WALL | Status: FUNCTIONAL
Brand: AZURE™ XT DR MRI SURESCAN™

## 2024-07-15 DEVICE — LEAD 5076-52 MRI US RCMCRD
Type: IMPLANTABLE DEVICE | Status: FUNCTIONAL
Brand: CAPSUREFIX NOVUS MRI™ SURESCAN®

## 2024-07-15 RX ORDER — ACETAMINOPHEN 650 MG/1
650 SUPPOSITORY RECTAL EVERY 6 HOURS PRN
Status: DISCONTINUED | OUTPATIENT
Start: 2024-07-15 | End: 2024-07-16 | Stop reason: HOSPADM

## 2024-07-15 RX ORDER — SODIUM CHLORIDE 0.9 % (FLUSH) 0.9 %
5-40 SYRINGE (ML) INJECTION EVERY 12 HOURS SCHEDULED
Status: DISCONTINUED | OUTPATIENT
Start: 2024-07-15 | End: 2024-07-16 | Stop reason: HOSPADM

## 2024-07-15 RX ORDER — MORPHINE SULFATE 10 MG/ML
2 INJECTION, SOLUTION INTRAMUSCULAR; INTRAVENOUS ONCE
Status: DISCONTINUED | OUTPATIENT
Start: 2024-07-15 | End: 2024-07-15 | Stop reason: SDUPTHER

## 2024-07-15 RX ORDER — POLYETHYLENE GLYCOL 3350 17 G/17G
17 POWDER, FOR SOLUTION ORAL DAILY PRN
Status: DISCONTINUED | OUTPATIENT
Start: 2024-07-15 | End: 2024-07-16 | Stop reason: HOSPADM

## 2024-07-15 RX ORDER — HYDRALAZINE HYDROCHLORIDE 20 MG/ML
10 INJECTION INTRAMUSCULAR; INTRAVENOUS EVERY 6 HOURS PRN
Status: DISCONTINUED | OUTPATIENT
Start: 2024-07-15 | End: 2024-07-16 | Stop reason: HOSPADM

## 2024-07-15 RX ORDER — HYDROCHLOROTHIAZIDE 25 MG/1
25 TABLET ORAL DAILY
Status: DISCONTINUED | OUTPATIENT
Start: 2024-07-16 | End: 2024-07-16 | Stop reason: HOSPADM

## 2024-07-15 RX ORDER — OMEPRAZOLE 20 MG/1
40 CAPSULE, DELAYED RELEASE ORAL DAILY
COMMUNITY

## 2024-07-15 RX ORDER — OXYCODONE HYDROCHLORIDE AND ACETAMINOPHEN 5; 325 MG/1; MG/1
1 TABLET ORAL EVERY 4 HOURS PRN
Status: DISCONTINUED | OUTPATIENT
Start: 2024-07-15 | End: 2024-07-16 | Stop reason: HOSPADM

## 2024-07-15 RX ORDER — DIPHENHYDRAMINE HCL 25 MG
25 CAPSULE ORAL EVERY 6 HOURS PRN
Status: DISCONTINUED | OUTPATIENT
Start: 2024-07-15 | End: 2024-07-16 | Stop reason: HOSPADM

## 2024-07-15 RX ORDER — ASPIRIN 81 MG/1
81 TABLET ORAL DAILY
Status: DISCONTINUED | OUTPATIENT
Start: 2024-07-16 | End: 2024-07-16 | Stop reason: HOSPADM

## 2024-07-15 RX ORDER — SODIUM CHLORIDE 0.9 % (FLUSH) 0.9 %
5-40 SYRINGE (ML) INJECTION PRN
Status: DISCONTINUED | OUTPATIENT
Start: 2024-07-15 | End: 2024-07-15 | Stop reason: HOSPADM

## 2024-07-15 RX ORDER — POTASSIUM CHLORIDE 7.45 MG/ML
10 INJECTION INTRAVENOUS PRN
Status: DISCONTINUED | OUTPATIENT
Start: 2024-07-15 | End: 2024-07-16 | Stop reason: HOSPADM

## 2024-07-15 RX ORDER — LANOLIN ALCOHOL/MO/W.PET/CERES
3 CREAM (GRAM) TOPICAL NIGHTLY
Status: DISCONTINUED | OUTPATIENT
Start: 2024-07-15 | End: 2024-07-16 | Stop reason: HOSPADM

## 2024-07-15 RX ORDER — SODIUM CHLORIDE 0.9 % (FLUSH) 0.9 %
5-40 SYRINGE (ML) INJECTION PRN
Status: DISCONTINUED | OUTPATIENT
Start: 2024-07-15 | End: 2024-07-16 | Stop reason: HOSPADM

## 2024-07-15 RX ORDER — ONDANSETRON 2 MG/ML
4 INJECTION INTRAMUSCULAR; INTRAVENOUS EVERY 6 HOURS PRN
Status: DISCONTINUED | OUTPATIENT
Start: 2024-07-15 | End: 2024-07-16 | Stop reason: HOSPADM

## 2024-07-15 RX ORDER — SODIUM CHLORIDE 9 MG/ML
INJECTION, SOLUTION INTRAVENOUS PRN
Status: DISCONTINUED | OUTPATIENT
Start: 2024-07-15 | End: 2024-07-15 | Stop reason: HOSPADM

## 2024-07-15 RX ORDER — LISINOPRIL 20 MG/1
20 TABLET ORAL DAILY
Status: DISCONTINUED | OUTPATIENT
Start: 2024-07-16 | End: 2024-07-16 | Stop reason: HOSPADM

## 2024-07-15 RX ORDER — SODIUM CHLORIDE 9 MG/ML
INJECTION, SOLUTION INTRAVENOUS PRN
Status: DISCONTINUED | OUTPATIENT
Start: 2024-07-15 | End: 2024-07-16 | Stop reason: HOSPADM

## 2024-07-15 RX ORDER — SODIUM CHLORIDE 0.9 % (FLUSH) 0.9 %
5-40 SYRINGE (ML) INJECTION EVERY 12 HOURS SCHEDULED
Status: DISCONTINUED | OUTPATIENT
Start: 2024-07-15 | End: 2024-07-15 | Stop reason: HOSPADM

## 2024-07-15 RX ORDER — MULTIVIT WITH MINERALS/LUTEIN
250 TABLET ORAL DAILY
COMMUNITY

## 2024-07-15 RX ORDER — MAGNESIUM SULFATE IN WATER 40 MG/ML
2000 INJECTION, SOLUTION INTRAVENOUS PRN
Status: DISCONTINUED | OUTPATIENT
Start: 2024-07-15 | End: 2024-07-16 | Stop reason: HOSPADM

## 2024-07-15 RX ORDER — MORPHINE SULFATE 2 MG/ML
2 INJECTION, SOLUTION INTRAMUSCULAR; INTRAVENOUS ONCE
Status: COMPLETED | OUTPATIENT
Start: 2024-07-15 | End: 2024-07-15

## 2024-07-15 RX ORDER — MAGNESIUM HYDROXIDE/ALUMINUM HYDROXICE/SIMETHICONE 120; 1200; 1200 MG/30ML; MG/30ML; MG/30ML
30 SUSPENSION ORAL EVERY 6 HOURS PRN
Status: DISCONTINUED | OUTPATIENT
Start: 2024-07-15 | End: 2024-07-16 | Stop reason: HOSPADM

## 2024-07-15 RX ORDER — MIDAZOLAM HYDROCHLORIDE 1 MG/ML
INJECTION INTRAMUSCULAR; INTRAVENOUS PRN
Status: DISCONTINUED | OUTPATIENT
Start: 2024-07-15 | End: 2024-07-15 | Stop reason: HOSPADM

## 2024-07-15 RX ORDER — POTASSIUM CHLORIDE 20 MEQ/1
40 TABLET, EXTENDED RELEASE ORAL PRN
Status: DISCONTINUED | OUTPATIENT
Start: 2024-07-15 | End: 2024-07-16 | Stop reason: HOSPADM

## 2024-07-15 RX ORDER — ACETAMINOPHEN 325 MG/1
650 TABLET ORAL EVERY 6 HOURS PRN
Status: DISCONTINUED | OUTPATIENT
Start: 2024-07-15 | End: 2024-07-16 | Stop reason: HOSPADM

## 2024-07-15 RX ORDER — ONDANSETRON 4 MG/1
4 TABLET, ORALLY DISINTEGRATING ORAL EVERY 8 HOURS PRN
Status: DISCONTINUED | OUTPATIENT
Start: 2024-07-15 | End: 2024-07-16 | Stop reason: HOSPADM

## 2024-07-15 RX ORDER — CETIRIZINE HYDROCHLORIDE 10 MG/1
10 TABLET ORAL DAILY
Status: DISCONTINUED | OUTPATIENT
Start: 2024-07-16 | End: 2024-07-16 | Stop reason: HOSPADM

## 2024-07-15 RX ORDER — LISINOPRIL AND HYDROCHLOROTHIAZIDE 25; 20 MG/1; MG/1
1 TABLET ORAL DAILY
Status: DISCONTINUED | OUTPATIENT
Start: 2024-07-15 | End: 2024-07-15

## 2024-07-15 RX ADMIN — SODIUM CHLORIDE, PRESERVATIVE FREE 10 ML: 5 INJECTION INTRAVENOUS at 21:16

## 2024-07-15 RX ADMIN — SODIUM CHLORIDE, PRESERVATIVE FREE 10 ML: 5 INJECTION INTRAVENOUS at 21:15

## 2024-07-15 RX ADMIN — MORPHINE SULFATE 2 MG: 2 INJECTION, SOLUTION INTRAMUSCULAR; INTRAVENOUS at 16:23

## 2024-07-15 ASSESSMENT — PAIN DESCRIPTION - ORIENTATION: ORIENTATION: LEFT

## 2024-07-15 ASSESSMENT — PAIN DESCRIPTION - LOCATION: LOCATION: CHEST

## 2024-07-15 ASSESSMENT — PAIN SCALES - GENERAL: PAINLEVEL_OUTOF10: 7

## 2024-07-15 NOTE — PROGRESS NOTES
TRANSFER - IN REPORT:    Verbal report received from IJEOMA Castelan on John Grigsby  being received from Cath Lab for routine progression of patient care      Report consisted of patient's Situation, Background, Assessment and   Recommendations(SBAR).     Information from the following report(s) Nurse Handoff Report, MAR, and Recent Results was reviewed with the receiving nurse.    Opportunity for questions and clarification was provided.      Assessment will be completed upon patient's arrival to unit and care assumed.

## 2024-07-15 NOTE — PROGRESS NOTES
TRANSFER - OUT REPORT:    Verbal report given to RN on John Grigsby  being transferred to CPRU for routine progression of patient care       Report consisted of patient's Situation, Background, Assessment and   Recommendations(SBAR).     Information from the following report(s) Nurse Handoff Report and MAR was reviewed with the receiving nurse.    DCPP Insertion with Dr. Rosa Degroot - DDDR  - Left Chest  Sutured, dermabond and aquacel    Ancef 2g IV preprocedure  Versed 2mg IV

## 2024-07-15 NOTE — PROGRESS NOTES
TRANSFER - OUT REPORT:    Verbal report given to IJEOMA Burger on John Grigsby  being transferred to [facility] for routine progression of patient care       Report consisted of patient’s Situation, Background, Assessment and Recommendations(SBAR).     Information from the following report(s) SBAR, Kardex, Procedure Summary, MAR, and Recent Results was reviewed with the receiving nurse.    Opportunity for questions and clarification was provided.     Left subclavian incision site post PPM. Site C/D/I without bleeding or hematoma.

## 2024-07-15 NOTE — PROGRESS NOTES
TRANSFER - IN REPORT:    Verbal report received from IJEOMA Miller on John Grigsby being received from [facility] for routine progression of patient care      Report consisted of patient’s Situation, Background, Assessment and Recommendations(SBAR).     Information from the following report(s) SBAR, Kardex, Procedure Summary, and MAR was reviewed with the receiving nurse.    Opportunity for questions and clarification was provided.      Assessment completed upon patient’s arrival to unit and care assumed.

## 2024-07-15 NOTE — PROGRESS NOTES
Patient received to CPR[facility] room # 10  Ambulatory from [facility]. Patient scheduled for PPM today with Dr Lee. Procedure reviewed & questions answered, voiced good understanding consent obtained & placed on chart. All medications and medical history reviewed. Will prep patient per orders. Patient & family updated on plan of care.      The patient has a fraility score of 3-MANAGING WELL, based on ambulaiton without assistance.

## 2024-07-16 VITALS
HEIGHT: 67 IN | WEIGHT: 213.19 LBS | TEMPERATURE: 98.2 F | BODY MASS INDEX: 33.46 KG/M2 | OXYGEN SATURATION: 95 % | RESPIRATION RATE: 17 BRPM | SYSTOLIC BLOOD PRESSURE: 131 MMHG | HEART RATE: 58 BPM | DIASTOLIC BLOOD PRESSURE: 77 MMHG

## 2024-07-16 LAB
ANION GAP SERPL CALC-SCNC: 8 MMOL/L (ref 9–18)
BASOPHILS # BLD: 0 K/UL (ref 0–0.2)
BASOPHILS NFR BLD: 0 % (ref 0–2)
BUN SERPL-MCNC: 19 MG/DL (ref 8–23)
CALCIUM SERPL-MCNC: 8.6 MG/DL (ref 8.8–10.2)
CHLORIDE SERPL-SCNC: 104 MMOL/L (ref 98–107)
CO2 SERPL-SCNC: 25 MMOL/L (ref 20–28)
CREAT SERPL-MCNC: 0.66 MG/DL (ref 0.8–1.3)
DIFFERENTIAL METHOD BLD: ABNORMAL
EOSINOPHIL # BLD: 0.2 K/UL (ref 0–0.8)
EOSINOPHIL NFR BLD: 3 % (ref 0.5–7.8)
ERYTHROCYTE [DISTWIDTH] IN BLOOD BY AUTOMATED COUNT: 12.8 % (ref 11.9–14.6)
GLUCOSE SERPL-MCNC: 105 MG/DL (ref 70–99)
HCT VFR BLD AUTO: 35.6 % (ref 41.1–50.3)
HGB BLD-MCNC: 11.7 G/DL (ref 13.6–17.2)
IMM GRANULOCYTES # BLD AUTO: 0 K/UL (ref 0–0.5)
IMM GRANULOCYTES NFR BLD AUTO: 0 % (ref 0–5)
LYMPHOCYTES # BLD: 1.5 K/UL (ref 0.5–4.6)
LYMPHOCYTES NFR BLD: 20 % (ref 13–44)
MCH RBC QN AUTO: 32.1 PG (ref 26.1–32.9)
MCHC RBC AUTO-ENTMCNC: 32.9 G/DL (ref 31.4–35)
MCV RBC AUTO: 97.8 FL (ref 82–102)
MONOCYTES # BLD: 0.8 K/UL (ref 0.1–1.3)
MONOCYTES NFR BLD: 10 % (ref 4–12)
NEUTS SEG # BLD: 4.9 K/UL (ref 1.7–8.2)
NEUTS SEG NFR BLD: 67 % (ref 43–78)
NRBC # BLD: 0 K/UL (ref 0–0.2)
PLATELET # BLD AUTO: 207 K/UL (ref 150–450)
PMV BLD AUTO: 9.4 FL (ref 9.4–12.3)
POTASSIUM SERPL-SCNC: 3.9 MMOL/L (ref 3.5–5.1)
RBC # BLD AUTO: 3.64 M/UL (ref 4.23–5.6)
SODIUM SERPL-SCNC: 136 MMOL/L (ref 136–145)
WBC # BLD AUTO: 7.4 K/UL (ref 4.3–11.1)

## 2024-07-16 PROCEDURE — 36415 COLL VENOUS BLD VENIPUNCTURE: CPT

## 2024-07-16 PROCEDURE — G0378 HOSPITAL OBSERVATION PER HR: HCPCS

## 2024-07-16 PROCEDURE — 80048 BASIC METABOLIC PNL TOTAL CA: CPT

## 2024-07-16 PROCEDURE — 6370000000 HC RX 637 (ALT 250 FOR IP): Performed by: NURSE PRACTITIONER

## 2024-07-16 PROCEDURE — 6370000000 HC RX 637 (ALT 250 FOR IP): Performed by: INTERNAL MEDICINE

## 2024-07-16 PROCEDURE — 2580000003 HC RX 258: Performed by: INTERNAL MEDICINE

## 2024-07-16 PROCEDURE — 85025 COMPLETE CBC W/AUTO DIFF WBC: CPT

## 2024-07-16 PROCEDURE — 2580000003 HC RX 258: Performed by: NURSE PRACTITIONER

## 2024-07-16 RX ADMIN — SODIUM CHLORIDE, PRESERVATIVE FREE 10 ML: 5 INJECTION INTRAVENOUS at 09:00

## 2024-07-16 RX ADMIN — HYDROCHLOROTHIAZIDE 25 MG: 25 TABLET ORAL at 08:58

## 2024-07-16 RX ADMIN — LISINOPRIL 20 MG: 20 TABLET ORAL at 08:59

## 2024-07-16 RX ADMIN — ASPIRIN 81 MG: 81 TABLET, COATED ORAL at 08:57

## 2024-07-16 RX ADMIN — SERTRALINE 25 MG: 50 TABLET, FILM COATED ORAL at 08:59

## 2024-07-16 RX ADMIN — CETIRIZINE HYDROCHLORIDE 10 MG: 10 TABLET, FILM COATED ORAL at 08:57

## 2024-07-16 ASSESSMENT — PAIN SCALES - GENERAL: PAINLEVEL_OUTOF10: 0

## 2024-07-16 NOTE — PROGRESS NOTES
I have reviewed discharge instructions with the patient and wife.  The patient verbalized understanding. Patient is stable and there are no signs of distress. Patient has all personal belongings and AVS. Pt accompanied to lobby with primary RN.

## 2024-07-16 NOTE — DISCHARGE INSTRUCTIONS
DEVICE IMPLANT FOLLOWUP INSTRUCTIONS  You will be discharged with an occlusive dressing over the incision site. This dressing will be removed at the 10 -14-day postop site check with the Device Clinic. Your new device will be checked at that visit and all your device teaching will be given.   Keep the incision site dry until your follow up. Use a hand-held shower or bath.   Do not submerge or soak in pools or tubs for 6 weeks. If you are discharged with a prescription for antibiotics, please take the full prescription until it is gone.  After your site check, you may shower and get the incision site wet. You may let soap and water run on the incision and pat dry. Please do not apply creams, lotions or powders on or near the incision.   Mild bruising and swelling can be normal after implant and will resolve in a few weeks.     Call the office at [phone] if you have any of the following:  -Signs of infection, such as fever over 100 F, drainage from the incision, redness, significant swelling, or warmth at the incision site.  -Significant pain around the site that gets worse. Mild discomfort can be normal.   -Bleeding from the incision site  -Swelling in the arm on the side of the incision site  -Chest pain or shortness of breath     Your device has the capability of transmitting device information from home to our office using a home monitoring system or phone genevieve. The information will transmit through a cellular connection outside of your home. Your device data is reviewed during working hours to ensure proper device function. You will be given your equipment and instruction upon your hospital discharge.                                                                       -You will be given a sling to wear upon discharge with instructions when it should be worn.   -Do not lift the affected arm above the shoulder level, on the side the device was put in,

## 2024-07-16 NOTE — DISCHARGE SUMMARY
[facility] Card[facility] Discharge Summary     Patient ID:  John Grigsby  [id number]  78 y.o.  1945    Admit date: 7/15/2024    Discharge date:  7/16/2024    Admitting Physician: Andrés Lee MD     Discharge Physician: Dr. Belle    Admission Diagnoses: Bradycardia [R00.1]  Pacemaker [Z95.0]  SSS (sick sinus syndrome) ([facility]) [I49.5]    Discharge Diagnoses:   Patient Active Problem List    Diagnosis    Pacemaker    S/P TAVR (transcatheter aortic valve replacement)    SSS (sick sinus syndrome) ([facility])    Coronary artery disease involving native coronary artery of native heart without angina pectoris    LBBB (left bundle branch block)    Primary hypertension    Mixed hyperlipidemia       Cardiology Procedures this admission:   pacemaker implantation, CXR  Consults: none    Hospital Course: Patient was seen at the office of [facility] by Dr. Belle for management of symptomatic bradycardia and was subsequently scheduled for an AM Admission PM implantation at [facility] on 7/15/24. Patient was taken to the cath lab and underwent successful implantation of Medtronic dual chamber PM by Dr. Lee. Patient tolerated the procedure well and was taken to the telemetry floor for recovery. Follow up chest xray showed no pneumothorax.     The following morning patient was up feeling well without any complaints of chest pain, shortness of breath, or palpitations. Patient's left subclavian cath site was clean, dry and intact without hematoma. Patient's labs were WNL. Patient was seen and examined by Dr. Belle and determined stable and ready for discharge. Patient was instructed on the importance of medication compliance and outpatient follow up. Patient has been scheduled for follow-up with [facility] -- device clinic iJuly 29 at 2 PM.  We will call pt with appt to see Dr Belle in a month.     10 minutes spent discussing hospital course and discharge instructions w pt and particularly wife

## 2024-07-29 ENCOUNTER — NURSE ONLY (OUTPATIENT)
Age: 79
End: 2024-07-29

## 2024-07-29 DIAGNOSIS — R00.1 SYMPTOMATIC BRADYCARDIA: Primary | ICD-10-CM

## 2024-08-08 ENCOUNTER — TELEPHONE (OUTPATIENT)
Age: 79
End: 2024-08-08

## 2024-08-08 RX ORDER — PITAVASTATIN CALCIUM 4.18 MG/1
4 TABLET, FILM COATED ORAL DAILY
Qty: 90 TABLET | Refills: 3 | Status: SHIPPED | OUTPATIENT
Start: 2024-08-08

## 2024-08-08 NOTE — TELEPHONE ENCOUNTER
MEDICATION REFILL REQUEST      Name of Medication:  Pitavastatin  Dose:  4 mg  Frequency:  QD  Quantity:  90  Days' supply:  90 with 3 refills      Pharmacy Name/Location:  [id number]

## 2024-08-08 NOTE — TELEPHONE ENCOUNTER
Prescription sent to pharmacy//[facility]  Requested Prescriptions     Signed Prescriptions Disp Refills    pitavastatin (LIVALO) 4 MG TABS tablet 90 tablet 3     Sig: Take 1 tablet by mouth daily     Authorizing Provider: MICHELL KATZ     Ordering User: RICCARDO GONSALEZ

## 2024-08-14 ASSESSMENT — ENCOUNTER SYMPTOMS: SHORTNESS OF BREATH: 0

## 2024-08-15 ENCOUNTER — OFFICE VISIT (OUTPATIENT)
Age: 79
End: 2024-08-15
Payer: MEDICARE

## 2024-08-15 VITALS
DIASTOLIC BLOOD PRESSURE: 74 MMHG | HEIGHT: 67 IN | WEIGHT: 208 LBS | HEART RATE: 76 BPM | BODY MASS INDEX: 32.65 KG/M2 | SYSTOLIC BLOOD PRESSURE: 128 MMHG

## 2024-08-15 DIAGNOSIS — Z95.2 S/P TAVR (TRANSCATHETER AORTIC VALVE REPLACEMENT): Primary | Chronic | ICD-10-CM

## 2024-08-15 DIAGNOSIS — I10 PRIMARY HYPERTENSION: ICD-10-CM

## 2024-08-15 DIAGNOSIS — I25.10 CORONARY ARTERY DISEASE INVOLVING NATIVE CORONARY ARTERY OF NATIVE HEART WITHOUT ANGINA PECTORIS: ICD-10-CM

## 2024-08-15 DIAGNOSIS — Z01.810 PREOP CARDIOVASCULAR EXAM: ICD-10-CM

## 2024-08-15 DIAGNOSIS — E78.2 MIXED HYPERLIPIDEMIA: ICD-10-CM

## 2024-08-15 PROCEDURE — 1123F ACP DISCUSS/DSCN MKR DOCD: CPT | Performed by: INTERNAL MEDICINE

## 2024-08-15 PROCEDURE — G8428 CUR MEDS NOT DOCUMENT: HCPCS | Performed by: INTERNAL MEDICINE

## 2024-08-15 PROCEDURE — G8417 CALC BMI ABV UP PARAM F/U: HCPCS | Performed by: INTERNAL MEDICINE

## 2024-08-15 PROCEDURE — 3078F DIAST BP <80 MM HG: CPT | Performed by: INTERNAL MEDICINE

## 2024-08-15 PROCEDURE — 99214 OFFICE O/P EST MOD 30 MIN: CPT | Performed by: INTERNAL MEDICINE

## 2024-08-15 PROCEDURE — 1036F TOBACCO NON-USER: CPT | Performed by: INTERNAL MEDICINE

## 2024-08-15 PROCEDURE — 3074F SYST BP LT 130 MM HG: CPT | Performed by: INTERNAL MEDICINE

## 2024-08-15 NOTE — PROGRESS NOTES
records/labs from outside providers have been reviewed and summarized as noted in the HPI, past history and data review sections of this note       ASSESSMENT and PLAN  Patient is being seen today within a 90-day global period, but is being seen for a separately identifiable E/M service and is not related to the post-operative care of the procedure.   Pacemaker recently implanted.  This will be followed by electrophysiology.  Visit today focused on known coronary artery disease and recent chest pain, hypertension, TAVR and preoperative assessment.              1. S/P TAVR (transcatheter aortic valve replacement)  Normal function.    2. Coronary artery disease involving native coronary artery of native heart without angina pectoris  No angina.  Continue aggressive risk factor modification.  If recurrent chest pain he will contact my office for further evaluation.    3. Mixed hyperlipidemia  Continue Livalo.    4. HTN  Discussed that I would not increase his lisinopril to 40 mg.  Will continue lisinopril 20/25 mg.  Follow home blood pressure readings.  If his blood pressure is elevated with an average above 130/85 would add an additional agent such as amlodipine.    5.  Preoperative assessment  Low risk for cochlear implant but should continue aspirin.   Patient with prior PCI and should continue ASA 81 mg in perioperative setting.  Patients with prior PCI are at increased risk of acute stent thrombosis when antiplatelet medications are discontinued.  Available data, although limited, support continuing low-dose aspirin without interruption in the perioperative period in PCI patients, as do the guidelines from the American College of Cardiology.    Below is some of the data for these recomendations:   POISE 2 trial comparing aspirin and placebo in 470 patients who had undergone PCI (427 had stent placement, and the rest had angioplasty or an unspecified type of PCI); 234 patients received aspirin and 236 placebo. The

## 2024-09-25 ENCOUNTER — TELEPHONE (OUTPATIENT)
Age: 79
End: 2024-09-25

## 2024-09-25 RX ORDER — LISINOPRIL AND HYDROCHLOROTHIAZIDE 20; 25 MG/1; MG/1
1 TABLET ORAL DAILY
Qty: 30 TABLET | Refills: 11 | Status: SHIPPED | OUTPATIENT
Start: 2024-09-25

## 2024-10-28 PROCEDURE — 93294 REM INTERROG EVL PM/LDLS PM: CPT | Performed by: INTERNAL MEDICINE

## 2024-10-28 PROCEDURE — 93296 REM INTERROG EVL PM/IDS: CPT | Performed by: INTERNAL MEDICINE

## 2024-11-07 ENCOUNTER — NURSE ONLY (OUTPATIENT)
Age: 79
End: 2024-11-07

## 2024-11-07 DIAGNOSIS — I49.5 SSS (SICK SINUS SYNDROME) (HCC): ICD-10-CM

## 2024-11-07 DIAGNOSIS — I44.7 LBBB (LEFT BUNDLE BRANCH BLOCK): Primary | ICD-10-CM

## 2024-11-20 ENCOUNTER — OFFICE VISIT (OUTPATIENT)
Age: 79
End: 2024-11-20

## 2024-11-20 VITALS
DIASTOLIC BLOOD PRESSURE: 74 MMHG | HEIGHT: 67 IN | WEIGHT: 206 LBS | SYSTOLIC BLOOD PRESSURE: 130 MMHG | HEART RATE: 62 BPM | BODY MASS INDEX: 32.33 KG/M2

## 2024-11-20 DIAGNOSIS — Z95.2 S/P TAVR (TRANSCATHETER AORTIC VALVE REPLACEMENT): Primary | Chronic | ICD-10-CM

## 2024-11-20 DIAGNOSIS — E78.2 MIXED HYPERLIPIDEMIA: ICD-10-CM

## 2024-11-20 DIAGNOSIS — I10 PRIMARY HYPERTENSION: ICD-10-CM

## 2024-11-20 DIAGNOSIS — Z95.0 PACEMAKER: ICD-10-CM

## 2024-11-20 DIAGNOSIS — I25.10 CORONARY ARTERY DISEASE INVOLVING NATIVE CORONARY ARTERY OF NATIVE HEART WITHOUT ANGINA PECTORIS: ICD-10-CM

## 2024-11-20 RX ORDER — LISINOPRIL 40 MG/1
40 TABLET ORAL DAILY
COMMUNITY

## 2024-11-20 RX ORDER — HYDROCHLOROTHIAZIDE 25 MG/1
25 TABLET ORAL DAILY
COMMUNITY

## 2024-11-20 RX ORDER — EZETIMIBE 10 MG/1
10 TABLET ORAL DAILY
Qty: 90 TABLET | Refills: 3 | Status: SHIPPED | OUTPATIENT
Start: 2024-11-20

## 2024-11-20 ASSESSMENT — ENCOUNTER SYMPTOMS: SHORTNESS OF BREATH: 0

## 2024-11-20 NOTE — PROGRESS NOTES
dilated to 4.0 cm.  EF 50-55%.       LBBB (left bundle branch block) 04/19/2022     Priority: Low    Primary hypertension 04/19/2022     Priority: Low    Mixed hyperlipidemia 04/19/2022     Priority: Low    Bilateral deafness 04/19/2022     Priority: Low        Social History     Tobacco Use    Smoking status: Never    Smokeless tobacco: Never   Substance Use Topics    Alcohol use: Yes     Comment: socially       ROS:    Review of Systems   Constitutional: Negative for malaise/fatigue.   HENT:  Positive for hearing loss.    Cardiovascular:  Negative for chest pain.   Respiratory:  Negative for shortness of breath.    Musculoskeletal:  Positive for arthritis.   Neurological:  Negative for focal weakness.   Psychiatric/Behavioral:  Negative for depression.            PHYSICAL EXAM:  Wt Readings from Last 3 Encounters:   11/20/24 93.4 kg (206 lb)   08/15/24 94.3 kg (208 lb)   07/16/24 96.7 kg (213 lb 3 oz)     BP Readings from Last 3 Encounters:   11/20/24 130/74   08/15/24 128/74   07/16/24 131/77     Pulse Readings from Last 3 Encounters:   11/20/24 62   08/15/24 76   07/16/24 58       Physical Exam  Constitutional:       General: He is not in acute distress.  Neck:      Vascular: No carotid bruit.   Cardiovascular:      Rate and Rhythm: Normal rate and regular rhythm.      Heart sounds: Murmur (GRade I/VI RENÉE) heard.   Pulmonary:      Effort: No respiratory distress.      Breath sounds: Normal breath sounds.   Abdominal:      General: Bowel sounds are normal.      Palpations: Abdomen is soft.   Musculoskeletal:         General: No swelling.   Skin:     General: Skin is warm and dry.   Neurological:      General: No focal deficit present.   Psychiatric:         Mood and Affect: Mood normal.         Medical problems and test results were reviewed with the patient today.       Lab Results   Component Value Date    WBC 7.4 07/16/2024    HGB 11.7 (L) 07/16/2024    HCT 35.6 (L) 07/16/2024    MCV 97.8 07/16/2024

## 2024-12-03 ENCOUNTER — TELEPHONE (OUTPATIENT)
Age: 79
End: 2024-12-03

## 2024-12-03 NOTE — TELEPHONE ENCOUNTER
Pt spouse is calling to make sure a medications 500 amoxicillim and 325 mg hydrocodone-acetamin for the pt tooth surgery. He was suppose to start this tomorrow. They was told to call Dr. Belle to make sure these medication are okay for the pt to take. Please reach back out to the pt spouse.

## 2024-12-03 NOTE — TELEPHONE ENCOUNTER
Wife called stating that patient is having a root canal and bone implant for teeth implants tomorrow. Patient has been prescribed amoxicillin 500 mg 2 tablets 1 hour prior to procedure then 1 tablet TiD until gone. Also was prescribed hydrocodone-acetaminophen 1-2 tablet every 6 hours as needed. Wife states that she wanted to make sure that Dr. Belle agrees with the medications//brendab

## 2025-01-14 ENCOUNTER — CLINICAL DOCUMENTATION (OUTPATIENT)
Age: 80
End: 2025-01-14

## 2025-01-15 NOTE — PROGRESS NOTES
Livalo 4MG tablets    Approved today by [employer] 2017  CaseId:[id number];Status:Approved;Review Type:Prior Auth;Coverage Start Date:12/15/2024;Coverage End Date:01/14/2026;

## 2025-03-07 ENCOUNTER — TELEPHONE (OUTPATIENT)
Age: 80
End: 2025-03-07

## 2025-03-07 NOTE — TELEPHONE ENCOUNTER
Patients wife reports patient had an episodes yesterday evening in which he has a \"toothache\" feeling in left chest area. Episode lasted for 2 hours. Wife reports patient had been working out in yard the day before. Denies any other symptoms. Made follow up appt with Dr. Belle 4/3/25 at 9:30 am. No issues noted on device. No current pain or complaints. Advised to continue to monitor for now. Seek ER for new or worsening symptoms. Patient wife verbalized understanding.

## 2025-03-07 NOTE — TELEPHONE ENCOUNTER
Wife calling with concerns of \" some pain last night.\" NO alerts were received. Denies any site issues.    Wife reporting chest pain coming and going throughout the night lasting several hours. Denies current pain.     Remote received with stable findings. NO episodes.   Atrial pacing 80% w/ RV pacing 0%,     NO device issues.     Will defer to triage.

## 2025-04-02 ASSESSMENT — ENCOUNTER SYMPTOMS: SHORTNESS OF BREATH: 0

## 2025-04-02 NOTE — PROGRESS NOTES
[facility] CARD[facility], [address], [address]  [place] [address]  PHONE: [phone]    John Grigsby  1945      SUBJECTIVE:   John Grigsby is a 79 y.o. male seen for a follow up visit regarding the following:     Chief Complaint   Patient presents with    Hypertension    Coronary Artery Disease       HPI:    John Grigsby presents for routine follow. Multiple issues addressed as outlined below:     Coronary Artery Disease  Status: Patient contacted our office with chest pain on March 7.   Notes he was carrying in bags and had some discomfort.  Lasted for 3 days.  Mears light buzz.  He characterizes this as an electrical sensation.  He is still walking 1-1/2 miles a day at a brisk pace without any chest pain or anginal symptoms.  Medications:  ASA 81 mg QD  Prior History:    Multi-vessel PCI (4/29/22):  3.5 x 28 mm Raoldo JAHAIRA to ostial to mid RCA. Post dilated to 4.0.    pLAD treated with 3.5 x 18 mm JAHAIRA. Post dilated to 4.0 cm.  EF 50-55%.     Prior TAVR  Status: No exertional chest pain, dizziness or syncope.  Prior History:     TAVR (6/7/22):  29 mm Hernandez Kwasi S3 valve.  Echo (7/3/2023): EF 57%.  TAVR.  MG 14.  PG 28.  Mild MR/TR.  Echo (7/12/2024): EF 55 to 60%.  TAVR: MG 10.  PG 21.  Mild MR.  Mild LAE.      Pacemaker  Status:  Pacemaker interrogated January 16 shows 81% atrially paced rhythm and less than 1% ventricular paced rhythm.  No documented atrial fibrillation.  Prior History:     Medtronic dual chamber pacemaker placement (7/15/24):  Dr. Lee.      Hypertension  Status: Blood pressure controlled.   Medications: Lisinopril 40 mg daily.  HCTZ 25 mg daily  Labs: BMP February 14, 2025 was normal.  Creatinine 0.83.  Potassium 4.1    Hyperlipidemia  Status: At patient's last visit it was noted that his lipids were not well-controlled.  Despite Livalo 4 mg his LDL was 115.  Zetia was added to his regimen.  His last lipid panel February 14 showed a total

## 2025-04-03 ENCOUNTER — OFFICE VISIT (OUTPATIENT)
Age: 80
End: 2025-04-03
Payer: MEDICARE

## 2025-04-03 VITALS
HEART RATE: 64 BPM | SYSTOLIC BLOOD PRESSURE: 132 MMHG | BODY MASS INDEX: 33.27 KG/M2 | DIASTOLIC BLOOD PRESSURE: 70 MMHG | HEIGHT: 67 IN | WEIGHT: 212 LBS

## 2025-04-03 DIAGNOSIS — E78.2 MIXED HYPERLIPIDEMIA: ICD-10-CM

## 2025-04-03 DIAGNOSIS — Z95.2 S/P TAVR (TRANSCATHETER AORTIC VALVE REPLACEMENT): Chronic | ICD-10-CM

## 2025-04-03 DIAGNOSIS — I25.10 CORONARY ARTERY DISEASE INVOLVING NATIVE CORONARY ARTERY OF NATIVE HEART WITHOUT ANGINA PECTORIS: Primary | ICD-10-CM

## 2025-04-03 DIAGNOSIS — I10 PRIMARY HYPERTENSION: ICD-10-CM

## 2025-04-03 DIAGNOSIS — Z95.0 PACEMAKER: ICD-10-CM

## 2025-04-03 PROCEDURE — 99214 OFFICE O/P EST MOD 30 MIN: CPT | Performed by: INTERNAL MEDICINE

## 2025-04-03 PROCEDURE — G8427 DOCREV CUR MEDS BY ELIG CLIN: HCPCS | Performed by: INTERNAL MEDICINE

## 2025-04-03 PROCEDURE — 1036F TOBACCO NON-USER: CPT | Performed by: INTERNAL MEDICINE

## 2025-04-03 PROCEDURE — 1123F ACP DISCUSS/DSCN MKR DOCD: CPT | Performed by: INTERNAL MEDICINE

## 2025-04-03 PROCEDURE — 3075F SYST BP GE 130 - 139MM HG: CPT | Performed by: INTERNAL MEDICINE

## 2025-04-03 PROCEDURE — G8417 CALC BMI ABV UP PARAM F/U: HCPCS | Performed by: INTERNAL MEDICINE

## 2025-04-03 PROCEDURE — 1159F MED LIST DOCD IN RCRD: CPT | Performed by: INTERNAL MEDICINE

## 2025-04-03 PROCEDURE — 3078F DIAST BP <80 MM HG: CPT | Performed by: INTERNAL MEDICINE

## 2025-04-30 PROCEDURE — 93294 REM INTERROG EVL PM/LDLS PM: CPT | Performed by: INTERNAL MEDICINE

## 2025-04-30 PROCEDURE — 93296 REM INTERROG EVL PM/IDS: CPT | Performed by: INTERNAL MEDICINE

## 2025-08-05 PROCEDURE — 93294 REM INTERROG EVL PM/LDLS PM: CPT | Performed by: INTERNAL MEDICINE

## 2025-08-05 PROCEDURE — 93296 REM INTERROG EVL PM/IDS: CPT | Performed by: INTERNAL MEDICINE

## 2025-08-11 ENCOUNTER — TELEPHONE (OUTPATIENT)
Age: 80
End: 2025-08-11

## 2025-08-11 RX ORDER — PITAVASTATIN CALCIUM 4.18 MG/1
4 TABLET, FILM COATED ORAL DAILY
Qty: 90 TABLET | Refills: 3 | Status: SHIPPED | OUTPATIENT
Start: 2025-08-11

## (undated) DEVICE — Device

## (undated) DEVICE — CATH BLLN DIL 2.5 X15MM RX -- EUPHORA

## (undated) DEVICE — DISPOSABLE PULLBACK SLED FOR MOTORDRIVE

## (undated) DEVICE — PTCA DILATATION CATHETER: Brand: NC QUANTUM APEX™

## (undated) DEVICE — GLIDESHEATH SLENDER STAINLESS STEEL KIT: Brand: GLIDESHEATH SLENDER

## (undated) DEVICE — AGENT HEMSTAT 3GM PURIFIED PLNT STARCH PWD ABSRB ARISTA AH

## (undated) DEVICE — BAND RADIAL COMPR ARTERY 24CM -- REG BX/10

## (undated) DEVICE — BAND COMPR L24CM REG CLR PLAS HEMSTAT EXT HK AND LOOP RETEN

## (undated) DEVICE — CATHETER GUID AD 6FR L100CM COR PERIPH GRN NYL PTFE AL 1

## (undated) DEVICE — SUTURE TICRON SZ 0 L36IN NONABSORBABLE BLU CV 300 L35MM 1 2 8886339361

## (undated) DEVICE — SUTURE PERMAHAND SZ 0 L30IN NONABSORBABLE BLK L30MM PSL 3/8 590H

## (undated) DEVICE — SUTURE V-LOC 180 SZ 2-0 L9IN ABSRB VLT GS-21 L37MM 1/2 CIR VLOCM0345

## (undated) DEVICE — CATHETER VASC 6 FR DIAG PGTL W/ SIDE H COR 110 CM LEN W/OUT

## (undated) DEVICE — Device: Brand: GRAND SLAM

## (undated) DEVICE — PINNACLE TIF INTRODUCER SHEATH: Brand: PINNACLE

## (undated) DEVICE — PINNACLE INTRODUCER SHEATH: Brand: PINNACLE

## (undated) DEVICE — CORONARY IMAGING CATHETER: Brand: OPTICROSS™ HD

## (undated) DEVICE — CATHETER DIAG 6FR L100CM GWIRE 0.038IN S STL POLYUR MP W/ 2

## (undated) DEVICE — CATHETER ANGIO INFIN 038IN AMPLATZ 534545T

## (undated) DEVICE — SUTURE V-LOC 90 3-0 L9IN ABSRB VLT L26MM V-20 1/2 CIR TAPR VLOCM0644

## (undated) DEVICE — DRESSING POSTOP AG PRISMASEAL 3.5X6IN

## (undated) DEVICE — SHEATH INTRO L12CM DIA6FR W/ LUERLOCK HUB HEMSTAS VLV

## (undated) DEVICE — FINECROSS MG CORONARY MICRO-GUIDE CATHETER: Brand: FINECROSS

## (undated) DEVICE — COPILOT BLEEDBACK CONTROL VALVE: Brand: COPILOT

## (undated) DEVICE — SOLUTION IRRIG 1000ML 09% SOD CHL USP PIC PLAS CONTAINER

## (undated) DEVICE — MERITMEDICAL 7FR PRELUDE SNAP PEEL-AWAY

## (undated) DEVICE — PLASMABLADE X PS210-030S-LIGHT 3.0SL: Brand: PLASMABLADE™ X

## (undated) DEVICE — LIQUIBAND RAPID ADHESIVE 36/CS 0.8ML: Brand: MEDLINE

## (undated) DEVICE — GUIDEWIRE 035IN 210CM PTFE COAT FIX COR J TIP 15MM FIRM BODY

## (undated) DEVICE — COVER,TABLE,HEAVY DUTY,79"X110",STRL: Brand: MEDLINE

## (undated) DEVICE — CATH LITHOPLSTY 3X12MM SHOCKWAVE

## (undated) DEVICE — DEVICE COMPR REG 24 CM VASC BND

## (undated) DEVICE — Device: Brand: ASAHI GLADIUS MONGO14

## (undated) DEVICE — GUIDEWIRE VASC L150CM DIA0.035IN FLX TIP L7CM PTFE STR FIX

## (undated) DEVICE — MICROPUNCTURE INTRODUCER SET SILHOUETTE TRANSITIONLESS WITH NITINOL WIRE GUIDE: Brand: MICROPUNCTURE

## (undated) DEVICE — PERCLOSE™ PROSTYLE™ SUTURE-MEDIATED CLOSURE AND REPAIR SYSTEM: Brand: PERCLOSE™ PROSTYLE™

## (undated) DEVICE — GUIDEWIRE VASC L260CM DIA0.035IN TAPR L11CM FLPY TIP L4CM

## (undated) DEVICE — RUNTHROUGH NS EXTRA FLOPPY PTCA GUIDEWIRE: Brand: RUNTHROUGH

## (undated) DEVICE — ELECTRODE DEFIB CARD W/ LVP GEL MULTFUNC PRO-PADZ

## (undated) DEVICE — CATHETER GUID EXTRA BACKUP 3.5 0.070IN 6FR 100CM VISTA BRITE TIP

## (undated) DEVICE — CATHETER DIAG AD 5FR L110CM 145DEG COR GRY HYDRPHLC NYL

## (undated) DEVICE — CATHETER DIAG AD 6FR L100CM 0.038IN COR POLYUR INT MAMM

## (undated) DEVICE — IMMOBILIZER SHLDR L L9X19.5IN R/L CANVS W/ WAIST STRP THMB

## (undated) DEVICE — Device: Brand: FIELDER XT

## (undated) DEVICE — CATH ANGI BLLN DIL 4.0X12MM -- NC EUPHORA

## (undated) DEVICE — 12 FOOT DISPOSABLE EXTENSION CABLE WITH SAFE CONNECT / SCREW-DOWN

## (undated) DEVICE — NO DESCRIPTION: Brand: SENTINEL